# Patient Record
Sex: MALE | Race: WHITE | Employment: FULL TIME | URBAN - METROPOLITAN AREA
[De-identification: names, ages, dates, MRNs, and addresses within clinical notes are randomized per-mention and may not be internally consistent; named-entity substitution may affect disease eponyms.]

---

## 2019-03-15 ENCOUNTER — TELEPHONE (OUTPATIENT)
Dept: UROLOGY | Facility: CLINIC | Age: 59
End: 2019-03-15

## 2019-03-15 NOTE — TELEPHONE ENCOUNTER
PREVISIT INFORMATION                                                    Dima Albright scheduled for future visit at [facility] specialty clinics.    Patient is scheduled to see Dr. Mark on 3/18/19  Reason for visit: Hydrocele  Referring provider:   Has patient seen previous specialist? Yes.  Clinic/Facility [facility].   Medical Records:  CareEverywhere, will unlock at patients appointment    REVIEW                                                      New patient packet mailed to patient: No  Medication reconciliation complete: No      PLAN/FOLLOW-UP NEEDED                                                      Patient Reminders Given:    Informed patient to bring an updated list of allergies, medications, pharmacy details and insurance information. Directed patient to come to the 2nd floor, check-in D for their appointment. Informed patient to call back if appointment needs to be cancelled or rescheduled at [phone].    Reminded patient to bring any outside records regarding this appointment or have them faxed to clinic at [phone].    Reminded patient to complete and bring in urology questionnaire. Also for patient to please come with a full bladder and to ask , if early to get staff member for sample.

## 2019-03-18 ENCOUNTER — TELEPHONE (OUTPATIENT)
Dept: UROLOGY | Facility: CLINIC | Age: 59
End: 2019-03-18

## 2019-03-18 ENCOUNTER — OFFICE VISIT (OUTPATIENT)
Dept: UROLOGY | Facility: CLINIC | Age: 59
End: 2019-03-18
Payer: COMMERCIAL

## 2019-03-18 VITALS
OXYGEN SATURATION: 95 % | HEART RATE: 76 BPM | BODY MASS INDEX: 42.66 KG/M2 | HEIGHT: 72 IN | DIASTOLIC BLOOD PRESSURE: 82 MMHG | WEIGHT: 315 LBS | SYSTOLIC BLOOD PRESSURE: 142 MMHG

## 2019-03-18 DIAGNOSIS — N43.2 OTHER HYDROCELE: Primary | ICD-10-CM

## 2019-03-18 DIAGNOSIS — N43.3 LEFT HYDROCELE: ICD-10-CM

## 2019-03-18 DIAGNOSIS — E66.01 MORBID OBESITY (H): ICD-10-CM

## 2019-03-18 PROCEDURE — 99203 OFFICE O/P NEW LOW 30 MIN: CPT | Performed by: UROLOGY

## 2019-03-18 RX ORDER — CEFAZOLIN SODIUM 1 G
1 VIAL (EA) INJECTION SEE ADMIN INSTRUCTIONS
Status: CANCELLED | OUTPATIENT
Start: 2019-03-18

## 2019-03-18 ASSESSMENT — MIFFLIN-ST. JEOR: SCORE: 2355.8

## 2019-03-18 ASSESSMENT — PAIN SCALES - GENERAL: PAINLEVEL: NO PAIN (0)

## 2019-03-18 NOTE — NURSING NOTE
Dima Albright's goals for this visit include:   Chief Complaint   Patient presents with     Consult     Hematuria       He requests these members of his care team be copied on today's visit information: yes    PCP: No primary care provider on file.    Referring Provider:  No referring provider defined for this encounter.    /82   Pulse 76   Ht 1.829 m (6')   Wt 148.8 kg (328 lb)   SpO2 95%   BMI 44.48 kg/m      Do you need any medication refills at today's visit? No    Bailey Nieto CMA

## 2019-03-18 NOTE — TELEPHONE ENCOUNTER
Patient is scheduled for surgery with Dr. Mark      Spoke or left message with: Dima    Date of Surgery: 6/4/19    Location: [place] OR    Informed patient they will need an adult  yes    Pre-op with surgeon (if applicable): n/a    H&P: Scheduled with pcp    Additional imaging/appointments: n/a    Surgery packet: mailed 3/18/19     Additional comments: n/a

## 2019-03-18 NOTE — PROGRESS NOTES
I am seeing Dima Albright in consultation for evaluation of left hydrocele.    HPI:  Dima Albright is a 58 year old male has had a left hydrocele for about 10 years.  He has to sit with his leg to the side and now is getting hip pain.  He works as a .  No pain, just heavy and uncomfortable.  This has enlarged slowly with time.  He had scrotal ultrasound years ago.    PSA 1.09 2/21/19      REVIEW OF SYSTEMS:  General: negative  Skin: negative  Eyes: negative  Ears/Nose/Throat: negative  Respiratory: negative  Cardiovascular: negative  Gastrointestinal: negative  Genitourinary: see HPI  Musculoskeletal: negative  Neurologic: negative  Psychiatric: negative  Hematologic/Lymphatic/Immunologic: negative  Endocrine: negative    PAST MEDICAL HX:  No chronic medical problems     PAST SURG HX:  No history of surgery.     FAMILY HX:  No family history on file.    SOCIAL HX:  Social History     Tobacco Use     Smoking status: Current Every Day Smoker     Smokeless tobacco: Former User   Substance Use Topics     Alcohol use: Not on file     Drug use: Not on file       MEDICATIONS:  No prescription medications     ALLERGIES:  Patient has no allergy information on record.      GENERAL PHYSICAL EXAM:     /82   Pulse 76   Ht 1.829 m (6')   Wt 148.8 kg (328 lb)   SpO2 95%   BMI 44.48 kg/m     Constitutional: No acute distress. Well nourished.   PSYCH: normal mood and affect.  NEURO: normal gait, no focal deficits.   EYES: anicteric, EOMI, PERR.  CARDIOPULMONARY: breathing non-labored, pulse regular rate/rhythm, no peripheral edema.  GI: Abdomen soft, non-tender, no surgical scars, no organomegaly.  MUSCULOSKELETAL: normal limb proportions, no muscle wasting, no contractures.  SKIN: Normal virilized hair distribution, no lesions, warts or rashes over genitalia, abdomen extremities or face.  HEME/LYMPH: no ecchymosis, no lymphadenopathy in groin, no lymphedema.     EXAM:  Phallus circumcised, meatus adequate,  no plaques palpated.   Left scrotum-  This has a very large ~1L hydrocele.  + transillumination.  Right testis normal     Prostate exam: deferred     Imaging/labs:  none    ASSESSMENT:     Large left hydrocele.    PLAN:    Scrotal ultrasound     Schedule left hydrocele repair.    Medicine pre-op.  Morbid obesity and smoking history.    Jovanni Mark MD   Urological Surgeon

## 2019-03-18 NOTE — PATIENT INSTRUCTIONS
Surgery Instructions    Always follow your surgeon s instructions. If you don t, your surgery could be cancelled. Please use the following checklist.  Your surgery is on: The surgery scheduler will contact you within 1 week of your consult with the surgeon. If you do not hear from them, please call the clinic or RN Care Coordinator for your provider.    Time: Prearrival times can vary depending on location/type of surgery.  [place] - 2 hour pre-arrival  [facility] - 2 hour pre-arrival  [place] - 1 hour pre-arrival    Note:  These times may change. A nurse will call you before surgery to confirm. If you have not received a call or if you have more questions, please call us on the working day before your surgery:  ? [place]: [phone] or [phone] (9am to 5:30pm)  ? [facility]: [phone] (8am to 6pm)  ? [place]: [phone] (9am to 5pm)  ? [facility] [phone] (7am to 4pm)  Prior to surgery  ? Have a pre-op physical exam with your Primary Doctor within 30 days of surgery  - Ask your doctor to send all of your results to the surgery center/hospital before surgery. Your doctor also may ask you to bring the results with you on the day of surgery.  - Tell your doctor if:  - You are allergic to latex or rubber (latex and rubber gloves are often used in medical care).  - You are taking any medicines (including aspirin), vitamins, or herbal products. You may need to stop taking some medicines before surgery.  - You have any medical problems (allergies, diabetes, or heart disease, for example).  - You have a pacemaker or an AICD (automatic implanted cardiac defibrillator). If you do, please bring the ID card with you on the day of surgery.  - People who smoke have a higher risk of infection after surgery. Ask your doctor how you can quit smoking.  - If you Primary Doctor is not within the [employer] system, you will need to have your pre-op physical faxed to us to be scanned into your  chart.  - [place]: [phone] or [phone]  - [facility]): [phone]  - [facility] ([facility]): [phone]  ? Call your insurance company. Ask if you need pre-approval for your surgery. If you do not have insurance, please let us know. If you wish to speak to the , please alert the clinic staff so this can be arranged.  ? Arrange for someone to drive you home after surgery.  will need to be a responsible adult (18 years or older) that will provide transportation to and from surgery and stay in the waiting room during your surgery. You may not drive yourself or take public transportation to and from surgery.  ? Arrange for someone to stay with you for 24 hours after you go home. This person must be a responsible adult (18 years or older).  ? Call your surgeon or their nurse if there is any change in your health (cold, flu, infections, hospitalizations).  ? Do not smoke, drink alcohol, or take over-the-counter medicine for 24 hours before and after surgery.  ? If you take prescribed drugs, you may need to stop them until after the surgery.  Discuss what medications to take or not take prior to surgery with your Primary Doctor at your pre-op physical. Avoid over-the-counter blood-thinning medications such as Aspirin, Ibuprofen, vitamin E, or fish oil 7 days prior to surgery (unless otherwise directed by your Primary Doctor). Tylenol is a good alternative for mild pain relief prior to surgery.  ? Eating and drinking guidelines prior to surgery:  - Stop all solid food consumption 8 hours prior to surgery  - You may drink clear liquids up to 2 hours prior to surgery (water, fruit juices without pulp, jello, tea/coffee without creamer, sports drinks, clear-fat free broth (bouillon or consomme), popsicles (without milk, bits of fruit, or seeds/nuts)  ? Follow instructions given for showering or bathing before surgery.    - Use 8 ounces of antiseptic surgical soap,  like:  - Hibiclens, Scrub Care, or Exidine  - You can find it at your local pharmacy, clinic, or retail store. If you have trouble, ask your pharmacist to help you find the right substitute.  - Please wash with one of the above soaps twice before coming to the hospital for your surgery. This will decrease bacteria (germs) on your skin. It will also help reduce your chance of infection after surgery.  - Items you will need for showering:  - 4 newly washed washcloths  - 2 newly washed towels  - 8 ounces of one of the above soaps  - Following these instructions:  - The evening before surgery: Shower or bathe as you normally would, using your regular soap and a clean washcloth. Give special attention to places where your incision (surgical cut) or catheters will be. This includes your groin area. Rinse well. You may wash your hair with your regular shampoo. Next, wash your body with 4 ounces of the antiseptic soap. Use a clean, damp washcloth and gently clean your body (from the chin down). If your surgery involves your head, use the special soap on your head and scalp. Rinse well and dry off using a newly washed towel.  - The morning of surgery: Repeat the same process as the evening shower.  - Other suggestions:    Do not shave within 12 inches of your incision (surgical cut) area for at least 3 days before surgery. Shaving can make small cuts in the skin. This puts you at higher risk of infection.    Wear freshly washed pajamas or clothing after your evening shower.    Wear freshly washed clothes the day of surgery.    Wash and change your bed sheets the day before surgery to have clean bed sheets after your shower and when you get home from surgery.    If you have trouble washing all areas, make sure someone helps you.    Don't use any deodorant, lotion or powder after your shower.    Women who are menstruating should wear a fresh sanitary pad to the hospital.  ? Do not wear or add deodorant, cologne, lotion,  makeup, nail polish or jewelry to surgery. If you wear fake nails, please remove at least one nail before coming to surgery (an oxygen monitor needs to be placed on your finger during surgery).  ? Bring these items to the surgery center/hospital:  - Insurance card  - Money for parking and co-pays, if needed  - A list of all the medicines you take. Include vitamins, minerals, herbs, and over-the-counter drugs.  Note any drug allergies.  - A copy of your advance health care directive, if you have one. This tells us what treatment you would want--and who would make health care decisions--if you could no longer speak for yourself. You may request this form in advance or download it from [email or web address].  - A case for glasses, contact lenses, hearing aids, or dentures.  - Your inhaler or CPAP machine, if you use these at home.  ? Leave extra cash, jewelry, and other valuables at home.  When you arrive  When you get to the surgery center/hospital, you will:  ? Check in. If you are under age 18, you must be with a parent or legal guardian.  ? Sign consent forms, if you haven t already. These forms state that you know the risks and benefits of surgery. When you sign the forms, you give us permission to do the surgery. Do not sign them unless you understand what will happen during and after your surgery. If you have any questions about your surgery, ask to speak with your doctor before you sign the forms. If you don t understand the answers, ask again.  ? Receive a copy of the Patient s Bill of Rights. If you do not receive a copy, please ask for one.  ? Change into hospital clothes. Your belongings will be placed in a bag. We will return them to you after surgery.  ? Meet with the anesthesia provider. He or she will tell you what kind of anesthesia (medicine) will be used to keep you comfortable during surgery.  Remember: it s okay to remind doctors and nurses to wash their hands before touching you.  In most  cases, your surgeon will use a marker to write his or her initials on the surgery site. This ensures that the exact site is operated on.  For safety reasons, we will ask you the same questions many times. For example, we may ask your name and birth date over and over again.  Friends and family can stay with you until it s time for surgery. While you re in surgery, they will be in the waiting area. Please note that cell phones are not allowed in some patient care areas.  If you have questions about what will happen in the operating room, talk to your care team.  After surgery  We will move you to a recovery room, where we will watch you closely. If you have any pain or discomfort, tell your nurse. He or she will try to make you comfortable.  If you are staying overnight, we will move you to your hospital room after you are awake.  If you are going home, we will move you to another room. Friends and family may be able to join you. The length of time you spend in recovery depend on the type of medicine you received, your medical condition, the type of surgery you had, or your response to the anesthesia given during your procedure.  When you are discharged from the recovery room, the nurses will review instructions with you and your caregiver.  ? Please wash your hands every time you touch the wound or change bandages or dressings.  ? Do not submerge the wound in water.  You may not use a bathtub or hot tub until the wound is closed. The wait time frame is generally 2-3 weeks, but any open area can be a source of incoming bacteria, so it is better to be on the safe side and avoid water submersion until your wound is fully healed.  ? You may take a shower 24 hours after surgery. Double check with your surgeon if it is OK for water to run over the wound, whether it has been sutured, stapled, glued, or is open. You may gently wash the wound using the antiseptic soap provided for your pre-surgery showering (do not use a  washcloth). Any mild soap will work as well.  ? Many surgical wounds will have small white strips of tape on them called steri-strips.  Do not remove these. The edges will curl and fall off within 7-10 days with normal showering.  ? If you are going home with sutures (stitches) or staples, you must return to the clinic to have them taken out, usually within 1-2 weeks. Some stitches are dissolvable and do not require removal. Make sure to clarify with your surgeon or surgery nurse reviewing discharge paperwork what kind of sutures you have.  ? Signs and symptoms of infection include:  - Fever, temperature over 101.5   F  - Redness  - Swelling  - Increased pain  - Green or yellow drainage which may or may not have a foul odor  Dealing with pain  A nurse will check your comfort level often during your stay. He or she will work with you to manage your pain.  Remember:  ? All pain is real. There are many ways to control pain. We can help you decide what works best for you.  ? Ask for pain medicine when you need it. Don t try to  tough it out --this can make you feel worse. Always take your medicine as ordered.  ? Medicine doesn t work the same for everyone. If your medicine isn t working, tell your nurse. There may be other medicines or treatments we can try.  Going home  We will let you know when you re ready to leave the surgery center or hospital. Before you leave, we will tell you how to care for yourself at home and prevent infections. If you do not understand something, please say so. We will answer any questions you have. We will then help you get ready to leave.  Remember, you must have a responsible adult (18 years or older) to stay with you 24 hours after you leave the hospital.  Take it easy when you get home. You will need some time to recover--you may be more tired than you realize at first. Rest and relax for at least the first 24 hours at home. You ll feel better and heal faster if you take good care of  yourself.  Follow the discharge instructions that are given to you when you leave the surgery center or hospital  Please call the clinic if you experience any problems during regular clinic hours (Monday-Friday 8:00am-5:00pm).  If you experience problems during non-clinic hours, please call the [facility] on-call line at [phone] and ask the  to page the on-call Provider for your specialty. The on-call Provider will call you back and can triage your symptoms and further advise. If you are having an emergency, always call 911 or seek immediate evaluation at the Emergency Room.  Locations  [facility] ([facility])  [address] [place] [id number]  [phone]   [email or web address]

## 2019-03-19 ENCOUNTER — ANCILLARY PROCEDURE (OUTPATIENT)
Dept: ULTRASOUND IMAGING | Facility: CLINIC | Age: 59
End: 2019-03-19
Attending: UROLOGY
Payer: COMMERCIAL

## 2019-03-19 DIAGNOSIS — N43.3 LEFT HYDROCELE: ICD-10-CM

## 2019-03-19 PROCEDURE — 93976 VASCULAR STUDY: CPT | Performed by: STUDENT IN AN ORGANIZED HEALTH CARE EDUCATION/TRAINING PROGRAM

## 2019-03-19 PROCEDURE — 76870 US EXAM SCROTUM: CPT | Mod: 51 | Performed by: STUDENT IN AN ORGANIZED HEALTH CARE EDUCATION/TRAINING PROGRAM

## 2019-05-20 ENCOUNTER — OFFICE VISIT (OUTPATIENT)
Dept: PEDIATRICS | Facility: CLINIC | Age: 59
End: 2019-05-20
Payer: COMMERCIAL

## 2019-05-20 VITALS
OXYGEN SATURATION: 95 % | TEMPERATURE: 99.2 F | WEIGHT: 315 LBS | HEIGHT: 72 IN | HEART RATE: 72 BPM | DIASTOLIC BLOOD PRESSURE: 92 MMHG | BODY MASS INDEX: 42.66 KG/M2 | SYSTOLIC BLOOD PRESSURE: 154 MMHG

## 2019-05-20 DIAGNOSIS — G47.33 OSA ON CPAP: ICD-10-CM

## 2019-05-20 DIAGNOSIS — E66.01 MORBID OBESITY (H): ICD-10-CM

## 2019-05-20 DIAGNOSIS — Z01.818 PREOP GENERAL PHYSICAL EXAM: Primary | ICD-10-CM

## 2019-05-20 DIAGNOSIS — I10 MILD HYPERTENSION: ICD-10-CM

## 2019-05-20 DIAGNOSIS — R07.89 CHEST WALL TENDERNESS: ICD-10-CM

## 2019-05-20 DIAGNOSIS — N43.3 LEFT HYDROCELE: ICD-10-CM

## 2019-05-20 PROCEDURE — 93000 ELECTROCARDIOGRAM COMPLETE: CPT | Performed by: INTERNAL MEDICINE

## 2019-05-20 PROCEDURE — 99204 OFFICE O/P NEW MOD 45 MIN: CPT | Performed by: INTERNAL MEDICINE

## 2019-05-20 RX ORDER — CYCLOBENZAPRINE HCL 10 MG
10 TABLET ORAL AT BEDTIME
Qty: 5 TABLET | Refills: 0 | Status: SHIPPED | OUTPATIENT
Start: 2019-05-20 | End: 2019-07-10

## 2019-05-20 RX ORDER — LISINOPRIL 10 MG/1
10 TABLET ORAL DAILY
Qty: 90 TABLET | Refills: 3 | Status: SHIPPED | OUTPATIENT
Start: 2019-05-20 | End: 2019-07-15

## 2019-05-20 ASSESSMENT — MIFFLIN-ST. JEOR: SCORE: 2368.5

## 2019-05-20 NOTE — PROGRESS NOTES
[address] [address]  [phone]  Dept: [phone]    PRE-OP EVALUATION:  Today's date: 2019    Dima Albright (: 1960) presents for pre-operative evaluation assessment as requested by Dr. Jovanni Mark.  He requires evaluation and anesthesia risk assessment prior to undergoing surgery/procedure for treatment of LEFT SCROTAL HYDROCELECTOMY REPARIR, POSSIBLE SCROTAL DRAIN PLACEMENT .    Proposed Surgery/ Procedure: LEFT SCROTAL HYDROCELECTOMY REPARIR, POSSIBLE SCROTAL DRAIN PLACEMENT  Date of Surgery/ Procedure: 19  Time of Surgery/ Procedure: 11:30 am  Hospital/Surgical Facility[place] [facility] [place]  Available electronically  Primary Physician: Physician Cynthia  Type of Anesthesia Anticipated: General    Patient has a Health Care Directive or Living Will:  NO    1. NO - Do you have a history of heart attack, stroke, stent, bypass or surgery on an artery in the head, neck, heart or legs?  2. NO - Do you ever have any pain or discomfort in your chest?  3. NO - Do you have a history of  Heart Failure?  4. YES - Are you troubled by shortness of breath when: walking on the level, up a slight hill or at night?  5. NO - Do you currently have a cold, bronchitis or other respiratory infection?  6. YES - Do you have a cough, shortness of breath or wheezing?  7. NO - Do you sometimes get pains in the calves of your legs when you walk?  8. NO - Do you or anyone in your family have previous history of blood clots?  9. NO - Do you or does anyone in your family have a serious bleeding problem such as prolonged bleeding following surgeries or cuts?  10. NO - Have you ever had problems with anemia or been told to take iron pills?  11. NO - Have you had any abnormal blood loss such as black, tarry or bloody stools, or abnormal vaginal bleeding?  12. NO - Have you ever had a blood transfusion?  13. NO - Have you or any of your  relatives ever had problems with anesthesia?  14. YES - Do you have sleep apnea, excessive snoring or daytime drowsiness? Sleep apnea/uses CPAP machine  15. NO - Do you have any prosthetic heart valves?  16. NO - Do you have prosthetic joints?  17. NO - Is there any chance that you may be pregnant?      HPI:     HPI related to upcoming procedure:     58-year-old gentleman came in for preop medical clearance and consultation for planned left hydrocelectomy.  He has had a left hydrocele for 10 years and it has gradually gotten bigger.  It does bother him particularly with ambulation.  He has morbid obesity and obstructive sleep apnea.  He uses his CPAP regularly.  Indicates that in the past his blood pressure has been borderline high in the 140s systolic range.  Few years ago he was prescribed a diuretic for mildly elevated blood pressure.  He like the side effect of increased frequency of urination.  He stopped taking it.  He does not follow a good low-salt diet.  He is a  by trade and spends a lot of time sitting.  Does not do any regular exercise.  Denies chest pain, shortness of breath, dizziness or lightheadedness.  He does notice some leg edema after he has been didi for a few days.  Denies gastrointestinal symptoms.  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  He felt a pop.  He was seen in the emergency room and was told to take anti-inflammatory medication.  He has been taking Advil and the symptoms have improved though still worst at night and he feels there is spasm of the muscles.      MEDICAL HISTORY:     Patient Active Problem List    Diagnosis Date Noted     Left hydrocele 05/20/2019     Priority: Medium     MICHA on CPAP 05/20/2019     Priority: Medium     Mild hypertension      Priority: Medium     Morbid obesity (H) 03/18/2019     Priority: Medium      Past Medical History:   Diagnosis  Date     Left hydrocele 5/20/2019     Mild hypertension      Morbid obesity (H) 3/18/2019     MICHA on CPAP 5/20/2019     History reviewed. No pertinent surgical history.  Current Outpatient Medications   Medication Sig Dispense Refill     cyclobenzaprine (FLEXERIL) 10 MG tablet Take 1 tablet (10 mg) by mouth At Bedtime for 5 days 5 tablet 0     lisinopril (PRINIVIL/ZESTRIL) 10 MG tablet Take 1 tablet (10 mg) by mouth daily 90 tablet 3     OTC products: None, except as noted above    No Known Allergies   Latex Allergy: NO    Social History     Tobacco Use     Smoking status: Current Every Day Smoker     Smokeless tobacco: Former User   Substance Use Topics     Alcohol use: Not on file     History   Drug Use Not on file       REVIEW OF SYSTEMS:   Constitutional, neuro, ENT, endocrine, pulmonary, cardiac, gastrointestinal, genitourinary, musculoskeletal, integument and psychiatric systems are negative, except as otherwise noted.    EXAM:   BP (!) 154/92   Pulse 72   Temp 99.2  F (37.3  C) (Temporal)   Ht 1.829 m (6')   Wt (!) 150 kg (330 lb 12.8 oz)   SpO2 95%   BMI 44.86 kg/m      GENERAL APPEARANCE: healthy, alert and no distress     EYES: EOMI,  PERRL     HENT: ear canals and TM's normal and nose and mouth without ulcers or lesions     NECK: no adenopathy, no asymmetry, masses, or scars and thyroid normal to palpation     RESP: lungs clear to auscultation - no rales, rhonchi or wheezes. Left lateral chest wall tenderness     CV: regular rates and rhythm, normal S1 S2, no S3 or S4 and no murmur, click or rub     ABDOMEN:  soft, nontender, no HSM or masses and bowel sounds normal     GU_male: hydrocele present left     MS: extremities normal- no gross deformities noted, no evidence of inflammation in joints, FROM in all extremities.     SKIN: no suspicious lesions or rashes     NEURO: Normal strength and tone, sensory exam grossly normal, mentation intact and speech normal     PSYCH: mentation appears normal. and  affect normal/bright     LYMPHATICS: No cervical adenopathy    DIAGNOSTICS:   EKG: appears normal, NSR, normal axis, normal intervals, no acute ST/T changes c/w ischemia, no LVH by voltage criteria,     No results for input(s): HGB, PLT, INR, NA, POTASSIUM, CR, A1C in the last 44762 hours.     IMPRESSION:   Diagnosis/reason for consult:     1.  Large left-sided hydrocele.  Patient to undergo surgery.  2.  Morbid obesity.  3.  Obstructive sleep apnea on CPAP.  4.  Mild essential hypertension.  Patient was agreeable to treatment and started him on lisinopril 10 mg a day.  Side effects discussed.  He will follow-up in 4 weeks.  5.  Left lateral chest wall pain is area of tenderness.  Rib fracture cannot be ruled out.  Treatment is symptomatic.  I did give him a prescription of Flexeril 10 mg to be taken at bedtime for the next 4 to 5 days.  He is now going to be didi next for 5 days.  Side effects discussed.    Fasting CMP CBC and lipids will be checked.  I will get back to the results.        The proposed surgical procedure is considered LOW risk.    REVISED CARDIAC RISK INDEX  The patient has the following serious cardiovascular risks for perioperative complications such as (MI, PE, VFib and 3  AV Block):  No serious cardiac risks  INTERPRETATION: 0 risks: Class I (very low risk - 0.4% complication rate)    The patient has the following additional risks for perioperative complications:  No identified additional risks      ICD-10-CM    1. Preop general physical exam Z01.818 EKG 12-lead complete w/read - Clinics   2. Left hydrocele N43.3 Comprehensive metabolic panel     CBC with platelets   3. Morbid obesity (H) E66.01    4. MICHA on CPAP G47.33     Z99.89    5. Mild hypertension I10 Comprehensive metabolic panel     CBC with platelets     Lipid Profile     lisinopril (PRINIVIL/ZESTRIL) 10 MG tablet   6. Chest wall tenderness R07.89 cyclobenzaprine (FLEXERIL) 10 MG tablet       RECOMMENDATIONS:     Patient is  medically optimized to undergo the planned surgical procedure.b    --Patient is to take all scheduled medications on the day of surgery EXCEPT for modifications listed below.    APPROVAL GIVEN to proceed with proposed procedure, without further diagnostic evaluation       Signed Electronically by: Jake Walker MD    Copy of this evaluation report is provided to requesting physician.    [facility] Preop Guidelines    Revised Cardiac Risk Index

## 2019-05-21 DIAGNOSIS — N43.3 LEFT HYDROCELE: ICD-10-CM

## 2019-05-21 DIAGNOSIS — I10 MILD HYPERTENSION: ICD-10-CM

## 2019-05-21 LAB
ALBUMIN SERPL-MCNC: 3.6 G/DL (ref 3.4–5)
ALP SERPL-CCNC: 111 U/L (ref 40–150)
ALT SERPL W P-5'-P-CCNC: 29 U/L (ref 0–70)
ANION GAP SERPL CALCULATED.3IONS-SCNC: 5 MMOL/L (ref 3–14)
AST SERPL W P-5'-P-CCNC: 20 U/L (ref 0–45)
BILIRUB SERPL-MCNC: 0.3 MG/DL (ref 0.2–1.3)
BUN SERPL-MCNC: 16 MG/DL (ref 7–30)
CALCIUM SERPL-MCNC: 9 MG/DL (ref 8.5–10.1)
CHLORIDE SERPL-SCNC: 107 MMOL/L (ref 94–109)
CHOLEST SERPL-MCNC: 212 MG/DL
CO2 SERPL-SCNC: 26 MMOL/L (ref 20–32)
CREAT SERPL-MCNC: 0.91 MG/DL (ref 0.66–1.25)
ERYTHROCYTE [DISTWIDTH] IN BLOOD BY AUTOMATED COUNT: 13.5 % (ref 10–15)
GFR SERPL CREATININE-BSD FRML MDRD: >90 ML/MIN/{1.73_M2}
GLUCOSE SERPL-MCNC: 94 MG/DL (ref 70–99)
HCT VFR BLD AUTO: 49.6 % (ref 40–53)
HDLC SERPL-MCNC: 37 MG/DL
HGB BLD-MCNC: 16.3 G/DL (ref 13.3–17.7)
LDLC SERPL CALC-MCNC: 116 MG/DL
MCH RBC QN AUTO: 30.6 PG (ref 26.5–33)
MCHC RBC AUTO-ENTMCNC: 32.9 G/DL (ref 31.5–36.5)
MCV RBC AUTO: 93 FL (ref 78–100)
NONHDLC SERPL-MCNC: 175 MG/DL
PLATELET # BLD AUTO: 321 10E9/L (ref 150–450)
POTASSIUM SERPL-SCNC: 4.3 MMOL/L (ref 3.4–5.3)
PROT SERPL-MCNC: 7.7 G/DL (ref 6.8–8.8)
RBC # BLD AUTO: 5.33 10E12/L (ref 4.4–5.9)
SODIUM SERPL-SCNC: 138 MMOL/L (ref 133–144)
TRIGL SERPL-MCNC: 295 MG/DL
WBC # BLD AUTO: 13 10E9/L (ref 4–11)

## 2019-05-21 PROCEDURE — 80061 LIPID PANEL: CPT | Performed by: INTERNAL MEDICINE

## 2019-05-21 PROCEDURE — 85027 COMPLETE CBC AUTOMATED: CPT | Performed by: INTERNAL MEDICINE

## 2019-05-21 PROCEDURE — 36415 COLL VENOUS BLD VENIPUNCTURE: CPT | Performed by: INTERNAL MEDICINE

## 2019-05-21 PROCEDURE — 80053 COMPREHEN METABOLIC PANEL: CPT | Performed by: INTERNAL MEDICINE

## 2019-06-04 ENCOUNTER — ANESTHESIA EVENT (OUTPATIENT)
Dept: SURGERY | Facility: CLINIC | Age: 59
End: 2019-06-04
Payer: COMMERCIAL

## 2019-06-04 ENCOUNTER — HOSPITAL ENCOUNTER (OUTPATIENT)
Facility: CLINIC | Age: 59
Discharge: HOME | End: 2019-06-04
Attending: UROLOGY | Admitting: UROLOGY
Payer: COMMERCIAL

## 2019-06-04 ENCOUNTER — ANESTHESIA (OUTPATIENT)
Dept: SURGERY | Facility: CLINIC | Age: 59
End: 2019-06-04
Payer: COMMERCIAL

## 2019-06-04 VITALS
DIASTOLIC BLOOD PRESSURE: 68 MMHG | TEMPERATURE: 98 F | HEIGHT: 72 IN | WEIGHT: 315 LBS | BODY MASS INDEX: 42.66 KG/M2 | SYSTOLIC BLOOD PRESSURE: 129 MMHG | HEART RATE: 86 BPM | OXYGEN SATURATION: 92 % | RESPIRATION RATE: 20 BRPM

## 2019-06-04 DIAGNOSIS — N43.3 LEFT HYDROCELE: Primary | ICD-10-CM

## 2019-06-04 LAB — GLUCOSE BLDC GLUCOMTR-MCNC: 124 MG/DL (ref 70–99)

## 2019-06-04 PROCEDURE — 36000053 ZZH SURGERY LEVEL 2 EA 15 ADDTL MIN - UMMC: Performed by: UROLOGY

## 2019-06-04 PROCEDURE — 25000125 ZZHC RX 250: Performed by: NURSE ANESTHETIST, CERTIFIED REGISTERED

## 2019-06-04 PROCEDURE — 36000051 ZZH SURGERY LEVEL 2 1ST 30 MIN - UMMC: Performed by: UROLOGY

## 2019-06-04 PROCEDURE — 25000564 ZZH DESFLURANE, EA 15 MIN: Performed by: UROLOGY

## 2019-06-04 PROCEDURE — 37000008 ZZH ANESTHESIA TECHNICAL FEE, 1ST 30 MIN: Performed by: UROLOGY

## 2019-06-04 PROCEDURE — 25000132 ZZH RX MED GY IP 250 OP 250 PS 637: Performed by: NURSE ANESTHETIST, CERTIFIED REGISTERED

## 2019-06-04 PROCEDURE — 25000128 H RX IP 250 OP 636: Performed by: NURSE ANESTHETIST, CERTIFIED REGISTERED

## 2019-06-04 PROCEDURE — 40000170 ZZH STATISTIC PRE-PROCEDURE ASSESSMENT II: Performed by: UROLOGY

## 2019-06-04 PROCEDURE — 25000125 ZZHC RX 250: Performed by: UROLOGY

## 2019-06-04 PROCEDURE — 25000566 ZZH SEVOFLURANE, EA 15 MIN: Performed by: UROLOGY

## 2019-06-04 PROCEDURE — 88302 TISSUE EXAM BY PATHOLOGIST: CPT | Performed by: UROLOGY

## 2019-06-04 PROCEDURE — 88302 TISSUE EXAM BY PATHOLOGIST: CPT | Mod: 26 | Performed by: UROLOGY

## 2019-06-04 PROCEDURE — 27210794 ZZH OR GENERAL SUPPLY STERILE: Performed by: UROLOGY

## 2019-06-04 PROCEDURE — 25800030 ZZH RX IP 258 OP 636: Performed by: NURSE ANESTHETIST, CERTIFIED REGISTERED

## 2019-06-04 PROCEDURE — 25000128 H RX IP 250 OP 636: Performed by: ANESTHESIOLOGY

## 2019-06-04 PROCEDURE — 71000014 ZZH RECOVERY PHASE 1 LEVEL 2 FIRST HR: Performed by: UROLOGY

## 2019-06-04 PROCEDURE — 37000009 ZZH ANESTHESIA TECHNICAL FEE, EACH ADDTL 15 MIN: Performed by: UROLOGY

## 2019-06-04 PROCEDURE — 25000128 H RX IP 250 OP 636: Performed by: UROLOGY

## 2019-06-04 PROCEDURE — 71000015 ZZH RECOVERY PHASE 1 LEVEL 2 EA ADDTL HR: Performed by: UROLOGY

## 2019-06-04 PROCEDURE — 82962 GLUCOSE BLOOD TEST: CPT

## 2019-06-04 PROCEDURE — 71000027 ZZH RECOVERY PHASE 2 EACH 15 MINS: Performed by: UROLOGY

## 2019-06-04 RX ORDER — PROPOFOL 10 MG/ML
INJECTION, EMULSION INTRAVENOUS PRN
Status: DISCONTINUED | OUTPATIENT
Start: 2019-06-04 | End: 2019-06-04

## 2019-06-04 RX ORDER — CEFAZOLIN SODIUM 1 G/3ML
1 INJECTION, POWDER, FOR SOLUTION INTRAMUSCULAR; INTRAVENOUS SEE ADMIN INSTRUCTIONS
Status: DISCONTINUED | OUTPATIENT
Start: 2019-06-04 | End: 2019-06-04 | Stop reason: HOSPADM

## 2019-06-04 RX ORDER — FENTANYL CITRATE 50 UG/ML
INJECTION, SOLUTION INTRAMUSCULAR; INTRAVENOUS PRN
Status: DISCONTINUED | OUTPATIENT
Start: 2019-06-04 | End: 2019-06-04

## 2019-06-04 RX ORDER — NALOXONE HYDROCHLORIDE 0.4 MG/ML
.1-.4 INJECTION, SOLUTION INTRAMUSCULAR; INTRAVENOUS; SUBCUTANEOUS
Status: DISCONTINUED | OUTPATIENT
Start: 2019-06-04 | End: 2019-06-04 | Stop reason: HOSPADM

## 2019-06-04 RX ORDER — FENTANYL CITRATE 50 UG/ML
25-50 INJECTION, SOLUTION INTRAMUSCULAR; INTRAVENOUS
Status: DISCONTINUED | OUTPATIENT
Start: 2019-06-04 | End: 2019-06-04 | Stop reason: HOSPADM

## 2019-06-04 RX ORDER — ONDANSETRON 2 MG/ML
4 INJECTION INTRAMUSCULAR; INTRAVENOUS EVERY 30 MIN PRN
Status: DISCONTINUED | OUTPATIENT
Start: 2019-06-04 | End: 2019-06-04 | Stop reason: HOSPADM

## 2019-06-04 RX ORDER — SODIUM CHLORIDE, SODIUM LACTATE, POTASSIUM CHLORIDE, CALCIUM CHLORIDE 600; 310; 30; 20 MG/100ML; MG/100ML; MG/100ML; MG/100ML
INJECTION, SOLUTION INTRAVENOUS CONTINUOUS
Status: DISCONTINUED | OUTPATIENT
Start: 2019-06-04 | End: 2019-06-04 | Stop reason: HOSPADM

## 2019-06-04 RX ORDER — ALBUTEROL SULFATE 90 UG/1
AEROSOL, METERED RESPIRATORY (INHALATION) PRN
Status: DISCONTINUED | OUTPATIENT
Start: 2019-06-04 | End: 2019-06-04

## 2019-06-04 RX ORDER — MEPERIDINE HYDROCHLORIDE 25 MG/ML
12.5 INJECTION INTRAMUSCULAR; INTRAVENOUS; SUBCUTANEOUS
Status: DISCONTINUED | OUTPATIENT
Start: 2019-06-04 | End: 2019-06-04 | Stop reason: HOSPADM

## 2019-06-04 RX ORDER — CITRIC ACID/SODIUM CITRATE 334-500MG
SOLUTION, ORAL ORAL PRN
Status: DISCONTINUED | OUTPATIENT
Start: 2019-06-04 | End: 2019-06-04

## 2019-06-04 RX ORDER — HYDROMORPHONE HYDROCHLORIDE 1 MG/ML
.3-.5 INJECTION, SOLUTION INTRAMUSCULAR; INTRAVENOUS; SUBCUTANEOUS EVERY 10 MIN PRN
Status: DISCONTINUED | OUTPATIENT
Start: 2019-06-04 | End: 2019-06-04 | Stop reason: HOSPADM

## 2019-06-04 RX ORDER — BACITRACIN ZINC 500 [USP'U]/G
OINTMENT TOPICAL PRN
Status: DISCONTINUED | OUTPATIENT
Start: 2019-06-04 | End: 2019-06-04 | Stop reason: HOSPADM

## 2019-06-04 RX ORDER — MAGNESIUM HYDROXIDE 1200 MG/15ML
LIQUID ORAL PRN
Status: DISCONTINUED | OUTPATIENT
Start: 2019-06-04 | End: 2019-06-04 | Stop reason: HOSPADM

## 2019-06-04 RX ORDER — OXYCODONE HYDROCHLORIDE 5 MG/1
5-10 TABLET ORAL EVERY 4 HOURS PRN
Qty: 20 TABLET | Refills: 0 | Status: ON HOLD | OUTPATIENT
Start: 2019-06-04 | End: 2019-06-15

## 2019-06-04 RX ORDER — SODIUM CHLORIDE, SODIUM LACTATE, POTASSIUM CHLORIDE, CALCIUM CHLORIDE 600; 310; 30; 20 MG/100ML; MG/100ML; MG/100ML; MG/100ML
INJECTION, SOLUTION INTRAVENOUS CONTINUOUS PRN
Status: DISCONTINUED | OUTPATIENT
Start: 2019-06-04 | End: 2019-06-04

## 2019-06-04 RX ORDER — OXYCODONE HYDROCHLORIDE 5 MG/1
5 TABLET ORAL EVERY 4 HOURS PRN
Status: DISCONTINUED | OUTPATIENT
Start: 2019-06-04 | End: 2019-06-04 | Stop reason: HOSPADM

## 2019-06-04 RX ORDER — BUPIVACAINE HYDROCHLORIDE 5 MG/ML
INJECTION, SOLUTION PERINEURAL PRN
Status: DISCONTINUED | OUTPATIENT
Start: 2019-06-04 | End: 2019-06-04 | Stop reason: HOSPADM

## 2019-06-04 RX ORDER — ONDANSETRON 4 MG/1
4 TABLET, ORALLY DISINTEGRATING ORAL EVERY 30 MIN PRN
Status: DISCONTINUED | OUTPATIENT
Start: 2019-06-04 | End: 2019-06-04 | Stop reason: HOSPADM

## 2019-06-04 RX ORDER — ONDANSETRON 2 MG/ML
INJECTION INTRAMUSCULAR; INTRAVENOUS PRN
Status: DISCONTINUED | OUTPATIENT
Start: 2019-06-04 | End: 2019-06-04

## 2019-06-04 RX ORDER — CEFAZOLIN SODIUM IN 0.9 % NACL 3 G/100 ML
3 INTRAVENOUS SOLUTION, PIGGYBACK (ML) INTRAVENOUS
Status: COMPLETED | OUTPATIENT
Start: 2019-06-04 | End: 2019-06-04

## 2019-06-04 RX ORDER — LIDOCAINE HYDROCHLORIDE 20 MG/ML
INJECTION, SOLUTION INFILTRATION; PERINEURAL PRN
Status: DISCONTINUED | OUTPATIENT
Start: 2019-06-04 | End: 2019-06-04

## 2019-06-04 RX ADMIN — FENTANYL CITRATE 50 MCG: 50 INJECTION, SOLUTION INTRAMUSCULAR; INTRAVENOUS at 12:37

## 2019-06-04 RX ADMIN — MIDAZOLAM 2 MG: 1 INJECTION INTRAMUSCULAR; INTRAVENOUS at 11:40

## 2019-06-04 RX ADMIN — Medication 3 G: at 13:52

## 2019-06-04 RX ADMIN — HYDROMORPHONE HYDROCHLORIDE 0.5 MG: 1 INJECTION, SOLUTION INTRAMUSCULAR; INTRAVENOUS; SUBCUTANEOUS at 13:21

## 2019-06-04 RX ADMIN — FENTANYL CITRATE 50 MCG: 50 INJECTION, SOLUTION INTRAMUSCULAR; INTRAVENOUS at 12:00

## 2019-06-04 RX ADMIN — Medication 100 MG: at 12:00

## 2019-06-04 RX ADMIN — ALBUTEROL SULFATE 12 PUFF: 90 AEROSOL, METERED RESPIRATORY (INHALATION) at 12:12

## 2019-06-04 RX ADMIN — HYDROMORPHONE HYDROCHLORIDE 0.25 MG: 1 INJECTION, SOLUTION INTRAMUSCULAR; INTRAVENOUS; SUBCUTANEOUS at 14:11

## 2019-06-04 RX ADMIN — Medication 3 G: at 11:45

## 2019-06-04 RX ADMIN — PHENYLEPHRINE HYDROCHLORIDE 100 MCG: 10 INJECTION INTRAVENOUS at 12:46

## 2019-06-04 RX ADMIN — SUGAMMADEX 300 MG: 100 INJECTION, SOLUTION INTRAVENOUS at 14:16

## 2019-06-04 RX ADMIN — PROPOFOL 50 MG: 10 INJECTION, EMULSION INTRAVENOUS at 12:37

## 2019-06-04 RX ADMIN — SODIUM CITRATE AND CITRIC ACID MONOHYDRATE 30 ML: 500; 334 SOLUTION ORAL at 11:52

## 2019-06-04 RX ADMIN — SODIUM CHLORIDE, SODIUM LACTATE, POTASSIUM CHLORIDE, CALCIUM CHLORIDE: 600; 310; 30; 20 INJECTION, SOLUTION INTRAVENOUS at 11:31

## 2019-06-04 RX ADMIN — PHENYLEPHRINE HYDROCHLORIDE 100 MCG: 10 INJECTION INTRAVENOUS at 12:54

## 2019-06-04 RX ADMIN — ONDANSETRON 4 MG: 2 INJECTION INTRAMUSCULAR; INTRAVENOUS at 15:45

## 2019-06-04 RX ADMIN — HYDROMORPHONE HYDROCHLORIDE 0.5 MG: 1 INJECTION, SOLUTION INTRAMUSCULAR; INTRAVENOUS; SUBCUTANEOUS at 12:44

## 2019-06-04 RX ADMIN — LIDOCAINE HYDROCHLORIDE 40 MG: 20 INJECTION, SOLUTION INFILTRATION; PERINEURAL at 12:00

## 2019-06-04 RX ADMIN — SODIUM CHLORIDE, SODIUM LACTATE, POTASSIUM CHLORIDE, CALCIUM CHLORIDE: 600; 310; 30; 20 INJECTION, SOLUTION INTRAVENOUS at 14:17

## 2019-06-04 RX ADMIN — PROPOFOL 200 MG: 10 INJECTION, EMULSION INTRAVENOUS at 12:00

## 2019-06-04 RX ADMIN — ROCURONIUM BROMIDE 30 MG: 10 INJECTION INTRAVENOUS at 12:10

## 2019-06-04 RX ADMIN — ONDANSETRON 4 MG: 2 INJECTION INTRAMUSCULAR; INTRAVENOUS at 12:07

## 2019-06-04 ASSESSMENT — MIFFLIN-ST. JEOR: SCORE: 2363.13

## 2019-06-04 ASSESSMENT — LIFESTYLE VARIABLES: TOBACCO_USE: 1

## 2019-06-04 NOTE — ANESTHESIA POSTPROCEDURE EVALUATION
Anesthesia POST Procedure Evaluation    Patient: Dima Albright   MRN:     [id number] Gender:   male   Age:    58 year old :      1960        Preoperative Diagnosis: Left Hydrocele   Procedure(s):  LEFT SCROTAL HYDROCELECTOMY REPAIR, AND SCROTAL DRAIN PLACEMENT   Postop Comments: No value filed.       Anesthesia Type:  General  No value filed.    Reportable Event: NO     PAIN: Uncomplicated   Sign Out status: Comfortable, Well controlled pain     PONV: No PONV   Sign Out status:  No Nausea or Vomiting     Neuro/Psych: Uneventful perioperative course   Sign Out Status: Preoperative baseline; Age appropriate mentation     Airway/Resp.: Uneventful perioperative course   Sign Out Status: Non labored breathing, age appropriate RR; Resp. Status within EXPECTED Parameters     CV: Uneventful perioperative course   Sign Out status: Appropriate BP and perfusion indices; Appropriate HR/Rhythm     Disposition:   Sign Out in:  PACU  Disposition:  Phase II; Home  Recovery Course: Uneventful  Follow-Up: Not required     Comments/Narrative:  Pt desats to 88-90% in Phase two sitting up in a chair on room air.  This is probably the pt's baseline. Pt does have MICHA and does use his CPAP every night.  Pt and family are upbeat and making jokes in Phase two. I checked all his lung fields, they are all CTA. He may go home.           Last Anesthesia Record Vitals:  CRNA VITALS  2019 1359 - 2019 1459      2019             Temp:  19  C (66.2  F)  (Abnormal)           Last PACU Vitals:  Vitals Value Taken Time   /66 2019  3:45 PM   Temp 36.7  C (98.1  F) 2019  3:45 PM   Pulse 86 2019  3:30 PM   Resp 23 2019  3:45 PM   SpO2 92 % 2019  3:45 PM   Temp src     NIBP     Pulse     SpO2     Resp     Temp 19  C (66.2  F) 2019  2:29 PM   Ht Rate     Temp 2           Electronically Signed By: Lizzette Hamm MD, 2019, 4:36 PM

## 2019-06-04 NOTE — DISCHARGE INSTRUCTIONS
Caring for Your Alphonse-De La O Drain    You have been discharged with a Alphonse-De La O drainage tube. This tube drains fluid from your incision, helping prevent swelling and reducing the risk for infection. The tube is held in place by a few stitches. The drain will be removed when your doctor determines you no longer need it and when the amount of drainage decreases. The color and amount of fluid varies. Right after surgery, the fluid may be bright red and may become clearer over time.   Dressing:    Keep the skin around the tube dry.    If you have a dressing, change it every day.   o Wash your hands.  o Remove the old bandage. Do not use scissors-you may accidentally cut the tube.  o Check for any redness, swelling, drainage, or broken stitches. (Call your doctor with any of these findings).  o Wash your hands again.  o Wet a cotton swab (Q-tip) and clean around the incision and the tube site. Use normal saline solution (salt and water) or soap and water. Start at the tube site and move outward in a circular motion.   o Pat dry.  o Put a new bandage on the incision and tube site. Make the bandage large enough to cover the whole incision area.   o Tape the bandage in place.  o Throw out old materials and wash your hands.   Home Care:    Tape the tube to the skin below the bandage. Make sure to keep some slack in the tube to keep it from pulling out.     DO NOT sleep on the same side as the tube.    Secure the tube and bulb inside your clothing with a safety pin. This helps keep the tube from being pulled out.     Keep the bulb compressed at all times, except when you empty it.    Empty your drain at least twice a day. Empty it more often if the drain is full.   o Lift the opening of the drain.  o Drain the fluid into a measuring cup.  o Record the amount of fluid each time you empty. Share the information with your doctor at your follow-up visit.   o Squeeze the bulb with your hands until you hear air coming out of  the bulb.  o Close the opening.     Tape plastic wrap over the bandage and tube site when you shower.      Stripping  the tube helps keep blood clots from blocking the tube.                         ONLY DO THIS IF YOUR MD INSTRUCTED YOU TO DO SO!  o Hold the tubing where it leaves the skin with one hand. This keeps it from pulling on the skin.  o Pinch the tubing with the thumb and first finger of your other hand.   o Slowly and firmly pull your thumb and first finger down the tube (squeezing the tube between your fingers). Keep squeezing the tube as you run your fingers towards the bulb. If the pulling hurts or feels like it is coming out of the skin, STOP. Begin again more gently.  o Let go of the tubing with both hands. If the tube is still blocked, repeat these steps three or four times. Make sure that the bulb is compressed so it creates suction.  When to call your doctor:    New or increased pain around the tube    Redness, warmth, or swelling around the incision or tube    Drainage that is foul smelling    Vomiting    Fever over 101 F degrees    Fluid leaking around the tube    Incision seems not to be healing    Stitches become loose    The tube falls out    Drainage that changes from light pick to dark red    A sudden increase or decrease in the amount of drainage (over 30 ml).  Your drainage record:  Date Time Bulb 1: Amount of drainage (ml or cc) Bulb 2: Amount of drainage (ml or cc) Notes                                                                                            Rev. 4/2014  Same-Day Surgery   Adult Discharge Orders & Instructions     For 24 hours after surgery:  1. Get plenty of rest.  A responsible adult must stay with you for at least 24 hours after you leave the hospital.   2. Pain medication can slow your reflexes. Do not drive or use heavy equipment.  If you have weakness or tingling, don't drive or use heavy equipment until this feeling goes away.  3. Mixing alcohol and pain  medication can cause dizziness and slow your breathing. It can even be fatal. Do not drink alcohol while taking pain medication.  4. Avoid strenuous or risky activities.  Ask for help when climbing stairs.   5. You may feel lightheaded.  If so, sit for a few minutes before standing.  Have someone help you get up.   6. If you have nausea (feel sick to your stomach), drink only clear liquids such as apple juice, ginger ale, broth or 7-Up.  Rest may also help.  Be sure to drink enough fluids.  Move to a regular diet as you feel able. Take pain medications with a small amount of solid food, such as toast or crackers, to avoid nausea.   7. A slight fever is normal. Call the doctor if your fever is over 100 F (37.7 C) (taken under the tongue) or lasts longer than 24 hours.  8. You may have a dry mouth, muscle aches, trouble sleeping or a sore throat.  These symptoms should go away after 24 hours.  9. Do not make important or legal decisions.   Pain Management:      1. Take pain medication (if prescribed) for pain as directed by your physician.        2. WARNING: If the pain medication you have been prescribed contains Tylenol  (acetaminophen), DO NOT take additional doses of Tylenol (acetaminophen).     Call your doctor for any of the followin.  Signs of infection (fever, growing tenderness at the surgery site, severe pain, a large amount of drainage or bleeding, foul-smelling drainage, redness, swelling).    2.  It has been over 8 to 10 hours since surgery and you are still not able to urinate (pee).    3.  Headache for over 24 hours.    4.  Numbness, tingling or weakness the day after surgery (if you had spinal anesthesia).  To contact a doctor, call _____________________________________ or:      [phone] and ask for the Resident On Call for:          __________________________________________ (answered 24 hours a day)      Emergency Department:  [facility] [place] [facility] Department: [phone]  [place]  Emergency Department: [phone]               Rev. 10/2014

## 2019-06-04 NOTE — OP NOTE
"June 4 2019      OPERATIVE NOTE    PREOPERATIVE DIAGNOSIS: Left spermatocele  POSTOPERATIVE DIAGNOSIS: same    PROCEDURES PERFORMED:   1. Left spermatocelectomy and hydrocelectomy    STAFF SURGEON: Jovanni Mark MD  RESIDENT(S): Pati An MD  ANESTHESIA: GET  ESTIMATED BLOOD LOSS: Minimal.   IV FLUIDS: see dictated anesthesia record  COMPLICATIONS: None.   SPECIMEN:    Left spermatocele sac    SIGNIFICANT FINDINGS:   None    BRIEF OPERATIVE INDICATIONS: Dima Albright is a 58 year old year old male with a bothersome left \"hydrocele\" that measures about 00e10m8 cm. He is a  and the discomfort sitting interferes with his work. Risks, benefits and alternatives were discussed and he elected to undergo the above named procedure.    Procedure in Detail: After informed consent was obtained, the patient was taken to the operating room and placed supine. Boots were applied, preoperative antibiotics were administered IV, anesthesia was induced, patient was intubated, and the genitals were prepped and draped.    The case was begun by injecting 8 ccs 1% Marcaine for a left sided cord block and another 5 ccs along a midline raphe. The skin was incised 5 cm longitudinal midline incision sharply. Blunt dissection and electrocautery were used to open the incision down to the tunica vaginalis. Adherent layers of dartos and spermatic fascia were opened circumferentially to better expose the tunica and mobilize more of the testicle. The tunica vaginalis was incised over the fluid collection exposing a window which was punctured and partially aspirated of clear watery fluid consistent with spermatocele. The aperture was then clamped. The adherent fascial layers of dartos and fascia were further dissected bluntly and with electrocautery allowing the testicle within the tunica vaginalis to be delivered. The spermatocele sac was dissected from the inner surface of the tunica vaginalis down to a neck which was ligated with " 4-0 Vicryl ties, and the spermatocele was excised. About 600 ccs of fluid was aspirated in total.  Another smaller epididymal head cyst was drained, the sac twisted upon itself, was suture ligated, and sharply excised.    With excision of the spermatocele the surrounding tunica vaginalis was revealed to be quite redundant creating a potential space for future hydrocele. Therefore it was further opened, everted and closed over the testicle in a running fashion from the inferior pole of the testicle to the cord (Joubalay technique), ensuring that the closure would not constrict the spermatic cord.     Care was taken to achieve hemostatis of small areas of bleeding with electrocautery along the testis, cord, and inner surface of the scrotum. The wound was irrigated with saline and hemostasis was confirmed. A drain was placed in the dependent scrotum and secured with a nylon stitch.  Dartos muscle was re-approximated using a running 3-0 Vicryl. Skin is close using 4-0 monocryl in a running horizontal mattress fashion. Another 8 ccs of local anesthetic was administered to the incisoin, 2 ccs to the drain site. A 5 ml cord block was repeated on the left.    Bacitracin, fluffs, and a supporter were applied. The patient was awoken from anesthesia without complication and transported to recovery in stable condition.         Pati An MD  PGY 2 Urology    I was present and scrubbed for the entire procedure.  Jovanni Mark MD  Urology Staff

## 2019-06-04 NOTE — OR NURSING
O2 Sats remain low 90's on Room Air, occasionally drops to 88% but comes right back up. Instructed pt on cough/deep breathing, and incentive spirometer. Lung sounds clear throughout, cough strong and productive. Pt has home CPAP with him. Pt verbalized readiness to discharge. Per Dr. Hamm ok to discharge home. Encouraged Cough/deep breathing. Pt's sister is a nurse, stated she will continue to encourage him to use incentive spirometer, cough/deep breathing, and CPAP.

## 2019-06-04 NOTE — ANESTHESIA PREPROCEDURE EVALUATION
"Anesthesia Pre-Procedure Evaluation    Patient: Dima Albright   MRN:     [id number] Gender:   male   Age:    58 year old :      1960        Preoperative Diagnosis: Left Hydrocele   Procedure(s):  LEFT SCROTAL HYDROCELECTOMY REPAIR, POSSIBLE SCROTAL DRAIN PLACEMENT     Past Medical History:   Diagnosis Date     Left hydrocele 2019     Mild hypertension      Morbid obesity (H) 3/18/2019     MICHA on CPAP 2019      History reviewed. No pertinent surgical history.       Anesthesia Evaluation     . Pt has not had prior anesthetic            ROS/MED HX    ENT/Pulmonary:     (+)sleep apnea, tobacco use, Current use 1 packs/day  uses CPAP , . .    Neurologic:  - neg neurologic ROS     Cardiovascular:     (+) Dyslipidemia, hypertension-range: 118/77, ---. : . . . :. . Previous cardiac testing date:results:date: results:ECG reviewed date:2019 results:SR date: results:          METS/Exercise Tolerance:  3 - Able to walk 1-2 blocks without stopping   Hematologic:  - neg hematologic  ROS       Musculoskeletal:  - neg musculoskeletal ROS       GI/Hepatic: Comment: Pt reports reflux, \"but much better over the last three years.\"    (+) GERD       Renal/Genitourinary: Comment: See HPI        Endo:     (+) Obesity (Morbidly obese), .      Psychiatric:  - neg psychiatric ROS       Infectious Disease:  - neg infectious disease ROS       Malignancy:      - no malignancy   Other:    - neg other ROS                     PHYSICAL EXAM:   Mental Status/Neuro: A/A/O   Airway: Facies: Challenging; Thick Neck  Mallampati: II  Mouth/Opening: Full  TM distance: > 6 cm  Neck ROM: Full   Respiratory: Auscultation: CTAB     Resp. Rate: Normal     Resp. Effort: Normal      CV: Rhythm: Regular  Rate: Age appropriate  Heart: Normal Sounds   Comments:      Dental: Normal                  Lab Results   Component Value Date    WBC 13.0 (H) 2019    HGB 16.3 2019    HCT 49.6 2019     2019     " "05/21/2019    POTASSIUM 4.3 05/21/2019    CHLORIDE 107 05/21/2019    CO2 26 05/21/2019    BUN 16 05/21/2019    CR 0.91 05/21/2019    GLC 94 05/21/2019    ESTEBAN 9.0 05/21/2019    ALBUMIN 3.6 05/21/2019    PROTTOTAL 7.7 05/21/2019    ALT 29 05/21/2019    AST 20 05/21/2019    ALKPHOS 111 05/21/2019    BILITOTAL 0.3 05/21/2019       Preop Vitals  BP Readings from Last 3 Encounters:   06/04/19 118/77   05/20/19 (!) 154/92   03/18/19 142/82    Pulse Readings from Last 3 Encounters:   06/04/19 78   05/20/19 72   03/18/19 76      Resp Readings from Last 3 Encounters:   06/04/19 18    SpO2 Readings from Last 3 Encounters:   06/04/19 95%   05/20/19 95%   03/18/19 95%      Temp Readings from Last 1 Encounters:   06/04/19 36.4  C (97.5  F) (Oral)    Ht Readings from Last 1 Encounters:   06/04/19 1.829 m (6' 0.01\")      Wt Readings from Last 1 Encounters:   06/04/19 149.5 kg (329 lb 9.4 oz)    Estimated body mass index is 44.69 kg/m  as calculated from the following:    Height as of this encounter: 1.829 m (6' 0.01\").    Weight as of this encounter: 149.5 kg (329 lb 9.4 oz).     LDA:  Peripheral IV 06/04/19 Left Lower forearm (Active)   Site Assessment WDL 6/4/2019 10:15 AM   Line Status Saline locked 6/4/2019 10:15 AM   Phlebitis Scale 0-->no symptoms 6/4/2019 10:15 AM   Infiltration Scale 0 6/4/2019 10:15 AM   Number of days: 0       ETT (adult) 8 (Active)   Number of days: 0            Assessment:   ASA SCORE: 3    NPO Status: > 6 hours since completed Solid [employer]   Documentation: H&P complete; Preop Testing complete; Consents complete   Proceeding: Proceed without further delay  Tobacco Use:  NO Active use of Tobacco/UNKNOWN Tobacco use status     Plan:   Anes. Type:  General   Pre-Induction: Midazolam IV   Induction:  IV (Standard) (Modified rapid sequence.  Bicitra preop, givien in OR.)   Airway: Oral ETT   Access/Monitoring: PIV   Maintenance: Balanced   Emergence: Procedure Site   Logistics: Same Day Surgery     Postop " Pain/Sedation Strategy:  Standard-Options: Opioids PRN     PONV Management:  Adult Risk Factors:, Postop Opioids  Prevention: Ondansetron     CONSENT: Direct conversation   Plan and risks discussed with: Patient (Sister and girlfriend with pt.)          Comments for Plan/Consent:  Pt seldom goes to the doctor.  Started lisinopril one week ago.  Morbidly obese.  Reviewed GA risks including sore throat, N/V, tooth damage, heart and lung issues.  Pt is a smoker, use LTA and albuterol.  GERD, use Bicitra. All questions answered.  Pt agrees with plan and wishes to proceed.                         Lizzette Hamm MD

## 2019-06-04 NOTE — ANESTHESIA CARE TRANSFER NOTE
Patient: Dima Albright    Procedure(s):  LEFT SCROTAL HYDROCELECTOMY REPAIR, AND SCROTAL DRAIN PLACEMENT    Diagnosis: Left Hydrocele  Diagnosis Additional Information: No value filed.    Anesthesia Type:   No value filed.     Note:  Airway :Face Mask  Patient transferred to:PACU  Handoff Report: Identifed the Patient, Identified the Reponsible Provider, Reviewed the pertinent medical history, Discussed the surgical course, Reviewed Intra-OP anesthesia mangement and issues during anesthesia, Set expectations for post-procedure period and Allowed opportunity for questions and acknowledgement of understanding      Vitals: (Last set prior to Anesthesia Care Transfer)    CRNA VITALS  6/4/2019 1405 - 6/4/2019 1435      6/4/2019             Temp:  19  C (66.2  F)  (Abnormal)                 Electronically Signed By: LUCILLE Miranda CRNA  June 4, 2019  2:35 PM

## 2019-06-04 NOTE — BRIEF OP NOTE
[facility], [place]    Brief Operative Note    Pre-operative diagnosis: Left Hydrocele  Post-operative diagnosis * No post-op diagnosis entered *  Procedure: Procedure(s):  LEFT SCROTAL HYDROCELECTOMY REPAIR, AND SCROTAL DRAIN PLACEMENT  Surgeon: Surgeon(s) and Role:     * Jovanni Mark MD - Primary     * Pati An MD - Resident - Assisting  Anesthesia: General   Estimated blood loss: Less than 10 ml  Drains: Alphonse-De [place] O  Specimens:   ID Type Source Tests Collected by Time Destination   A : LEFT SCOTAL HYROCELE  Tissue Scrotum SURGICAL PATHOLOGY EXAM Jovanni Mark MD 6/4/2019  1:23 PM      Findings:   None.  Complications: None.  Implants:  * No implants in log *

## 2019-06-07 ENCOUNTER — TELEPHONE (OUTPATIENT)
Dept: UROLOGY | Facility: CLINIC | Age: 59
End: 2019-06-07

## 2019-06-07 LAB — COPATH REPORT: NORMAL

## 2019-06-07 NOTE — TELEPHONE ENCOUNTER
Spoke with patient and sister (with patient permission as she is a nurse). 3 days s/p spermatocelectomy and hydrocelectomy of left side. States that they have not been icing much but have applied ice at present, wrap/dressing keeps falling off and has been reapplied, drain was pulled yesterday that had only approximately 8 ml of serosanguinous fluid present. Denies today: excessive drainage, increased heat, chills, fever, bruising, decreased sensation, skin changes, redness or nausea. Patient has been following activity levels as ordered. Has a history of constipation issues and has taken over the counter meds for this with success.  Concerns today are increased pain and swelling to the surgical area. Has weened the Oxycodone to 5mg p.o. Every 6 hours, taking Ibuprofen 800 mg every 8 hours. Patient is concerned that he will run out of Oxycodone over the weekend and looking for refill. Also, concerned about the increased pain and swelling noted today. Advised to rewrap the surgical area as ordered. Ice was applied to the area. Advised to place scrotum closer to body to prevent fluid accumulation. Patient states that he is not wearing athletic support. Will send this to the provider on call for advisement. Pharmacy of choice per patient is the [facility] in [place].     Medication request: Oxycodone 5mg  Sig: taking one every 6 hours   Prescription written: 6/4/19  Last filled: 6/4/19  Last Qty: 20  Pt's last office visit: 6/4/19  Next scheduled office visit: 6/19/19 with Dr Mark.  Quantity left: 4 pills   Sonia Ag, SKYLERN, RN

## 2019-06-07 NOTE — TELEPHONE ENCOUNTER
"Patient advised per Dr Mark that Swelling and bruising are common after this surgery. Advised patient that the care for this per Dr Mark is to rewrap surgical area, wear supportive underwear, ice, elevate scrotum especially when supine. Advised patient that per Dr Mark that if pain or swelling are out of control advised will need to go to the ER for evaluation. Advised that there are no miracle cures, but that it is not atypical for the surgical area to look worse before they get better. Patient states now that he is down to 2 pills. Patient states that he took 2 oxycodone recently even though was advised by both his sister and nurse to take one with tylenol over the counter to elongate the action of the oxycodone and provide pain relief in between his ibuprofen doses. Patient states he is wearing boxer shorts, does not own athletic strap, and was advised that tight fitting underwear work as well. Patient was asked and offered to review post operative directions but refusing to wear tight fitting underwear even though was advised of the risks and benefits to wearing supportive underwear. Patient stated,\" so what you're telling me is you won't refill my pain medication and that is all I need to hear\". Call was then terminated by patient. Sonia Ag RN    "

## 2019-06-10 ENCOUNTER — OFFICE VISIT (OUTPATIENT)
Dept: UROLOGY | Facility: CLINIC | Age: 59
End: 2019-06-10
Payer: COMMERCIAL

## 2019-06-10 ENCOUNTER — TELEPHONE (OUTPATIENT)
Dept: UROLOGY | Facility: CLINIC | Age: 59
End: 2019-06-10

## 2019-06-10 ENCOUNTER — ANCILLARY PROCEDURE (OUTPATIENT)
Dept: ULTRASOUND IMAGING | Facility: CLINIC | Age: 59
End: 2019-06-10
Attending: UROLOGY
Payer: COMMERCIAL

## 2019-06-10 VITALS — DIASTOLIC BLOOD PRESSURE: 79 MMHG | OXYGEN SATURATION: 96 % | HEART RATE: 92 BPM | SYSTOLIC BLOOD PRESSURE: 129 MMHG

## 2019-06-10 DIAGNOSIS — N49.2 CELLULITIS, SCROTUM: ICD-10-CM

## 2019-06-10 DIAGNOSIS — Z09 POSTOP CHECK: ICD-10-CM

## 2019-06-10 DIAGNOSIS — N49.2 CELLULITIS, SCROTUM: Primary | ICD-10-CM

## 2019-06-10 PROCEDURE — 76870 US EXAM SCROTUM: CPT | Mod: 51 | Performed by: RADIOLOGY

## 2019-06-10 PROCEDURE — 99024 POSTOP FOLLOW-UP VISIT: CPT | Performed by: UROLOGY

## 2019-06-10 PROCEDURE — 93976 VASCULAR STUDY: CPT | Performed by: RADIOLOGY

## 2019-06-10 RX ORDER — OXYCODONE AND ACETAMINOPHEN 5; 325 MG/1; MG/1
1 TABLET ORAL EVERY 6 HOURS PRN
Qty: 20 TABLET | Refills: 0 | Status: ON HOLD | OUTPATIENT
Start: 2019-06-10 | End: 2019-06-15

## 2019-06-10 ASSESSMENT — PAIN SCALES - GENERAL: PAINLEVEL: SEVERE PAIN (7)

## 2019-06-10 NOTE — TELEPHONE ENCOUNTER
Per Dr. Mark, called pt and informed him scrotal US was normal, No evidence of abscess or internal infection. Dr. Mark states pt is to keep taking antibiotics and return to clinic Wednesday for recheck as scheduled. If symptoms worsen before f/u, pt to go to ER. Pt stated understanding and had no further questions.    Maria L Souza LPN

## 2019-06-10 NOTE — PROGRESS NOTES
CC: Dima Albright is post-op from left spermatocele repair done 6d ago.    HPI: Patient is 1 wks post-op.  he has been doing poorly. No fevers or chills, but has a very painful enlarged scrotum. Pain worsening, with swelling, redness, tenderness, edema of the scrotum, shaft skin also is edematous.  No drainage from the intact incision.  No crepitus or skin necrosis.        Exam:     /79 (BP Location: Left arm, Patient Position: Sitting, Cuff Size: Adult Regular)   Pulse 92   SpO2 96%     Alert, NAD.     Respirations normal, non-labored.    Incision clean, dry, intact.  This is the picture of post-op scrotal cellulitis, however.  Pitting edema of the scrotal skin, skin erythematous, warm, tender to palpation.  No crepitus.  No evidence of Zo's gangrene. Extensive skin edema of the majority of the scrotum.    PLAN:     Augmentin prescription today.  Start ASAP.    Elevated scrotum.    Scrotal ultrasound today.    Percocet for pain.    Return to clinic 48 hours.  If not better or if scrotal ultrasound shows gas forming infection will hospitalize for IV antibiotics.    Alexander VARGAS

## 2019-06-10 NOTE — NURSING NOTE
Dima Albright's goals for this visit include:   Chief Complaint   Patient presents with     RECHECK     Post-op concern       He requests these members of his care team be copied on today's visit information: Yes    PCP: No Ref-Primary, Physician    Referring Provider:  No referring provider defined for this encounter.    /79 (BP Location: Left arm, Patient Position: Sitting, Cuff Size: Adult Regular)   Pulse 92   SpO2 96%     Do you need any medication refills at today's visit? No

## 2019-06-12 ENCOUNTER — APPOINTMENT (OUTPATIENT)
Dept: CT IMAGING | Facility: CLINIC | Age: 59
DRG: 728 | End: 2019-06-12
Attending: UROLOGY
Payer: COMMERCIAL

## 2019-06-12 ENCOUNTER — HOSPITAL ENCOUNTER (INPATIENT)
Facility: CLINIC | Age: 59
LOS: 2 days | Discharge: HOME | DRG: 728 | End: 2019-06-15
Attending: UROLOGY | Admitting: UROLOGY
Payer: COMMERCIAL

## 2019-06-12 ENCOUNTER — OFFICE VISIT (OUTPATIENT)
Dept: UROLOGY | Facility: CLINIC | Age: 59
End: 2019-06-12
Payer: COMMERCIAL

## 2019-06-12 DIAGNOSIS — Z09 POSTOP CHECK: Primary | ICD-10-CM

## 2019-06-12 DIAGNOSIS — L03.818 CELLULITIS OF OTHER SPECIFIED SITE: ICD-10-CM

## 2019-06-12 DIAGNOSIS — N49.2 CELLULITIS OF SCROTUM: Primary | ICD-10-CM

## 2019-06-12 PROBLEM — L03.90 CELLULITIS: Status: ACTIVE | Noted: 2019-06-12

## 2019-06-12 LAB
ANION GAP SERPL CALCULATED.3IONS-SCNC: 4 MMOL/L (ref 3–14)
BUN SERPL-MCNC: 17 MG/DL (ref 7–30)
CALCIUM SERPL-MCNC: 9.1 MG/DL (ref 8.5–10.1)
CHLORIDE SERPL-SCNC: 106 MMOL/L (ref 94–109)
CO2 SERPL-SCNC: 27 MMOL/L (ref 20–32)
CREAT SERPL-MCNC: 1 MG/DL (ref 0.66–1.25)
ERYTHROCYTE [DISTWIDTH] IN BLOOD BY AUTOMATED COUNT: 13.2 % (ref 10–15)
GFR SERPL CREATININE-BSD FRML MDRD: 84 ML/MIN/{1.73_M2}
GLUCOSE SERPL-MCNC: 133 MG/DL (ref 70–99)
HCT VFR BLD AUTO: 45.8 % (ref 40–53)
HGB BLD-MCNC: 14.4 G/DL (ref 13.3–17.7)
MCH RBC QN AUTO: 30 PG (ref 26.5–33)
MCHC RBC AUTO-ENTMCNC: 31.4 G/DL (ref 31.5–36.5)
MCV RBC AUTO: 95 FL (ref 78–100)
PLATELET # BLD AUTO: 377 10E9/L (ref 150–450)
POTASSIUM SERPL-SCNC: 4.2 MMOL/L (ref 3.4–5.3)
RADIOLOGIST FLAGS: ABNORMAL
RBC # BLD AUTO: 4.8 10E12/L (ref 4.4–5.9)
SODIUM SERPL-SCNC: 138 MMOL/L (ref 133–144)
WBC # BLD AUTO: 13.9 10E9/L (ref 4–11)

## 2019-06-12 PROCEDURE — 40000141 ZZH STATISTIC PERIPHERAL IV START W/O US GUIDANCE

## 2019-06-12 PROCEDURE — G0378 HOSPITAL OBSERVATION PER HR: HCPCS

## 2019-06-12 PROCEDURE — 25800030 ZZH RX IP 258 OP 636: Performed by: UROLOGY

## 2019-06-12 PROCEDURE — 40000275 ZZH STATISTIC RCP TIME EA 10 MIN: Performed by: OPTOMETRIST

## 2019-06-12 PROCEDURE — 87077 CULTURE AEROBIC IDENTIFY: CPT | Performed by: UROLOGY

## 2019-06-12 PROCEDURE — 87040 BLOOD CULTURE FOR BACTERIA: CPT | Performed by: UROLOGY

## 2019-06-12 PROCEDURE — 96376 TX/PRO/DX INJ SAME DRUG ADON: CPT

## 2019-06-12 PROCEDURE — 80048 BASIC METABOLIC PNL TOTAL CA: CPT | Performed by: UROLOGY

## 2019-06-12 PROCEDURE — 85027 COMPLETE CBC AUTOMATED: CPT | Performed by: UROLOGY

## 2019-06-12 PROCEDURE — 96375 TX/PRO/DX INJ NEW DRUG ADDON: CPT

## 2019-06-12 PROCEDURE — 87800 DETECT AGNT MULT DNA DIREC: CPT | Performed by: UROLOGY

## 2019-06-12 PROCEDURE — 72193 CT PELVIS W/DYE: CPT

## 2019-06-12 PROCEDURE — 99024 POSTOP FOLLOW-UP VISIT: CPT | Performed by: UROLOGY

## 2019-06-12 PROCEDURE — 25000128 H RX IP 250 OP 636: Performed by: STUDENT IN AN ORGANIZED HEALTH CARE EDUCATION/TRAINING PROGRAM

## 2019-06-12 PROCEDURE — 96374 THER/PROPH/DIAG INJ IV PUSH: CPT | Mod: 59

## 2019-06-12 PROCEDURE — 25000128 H RX IP 250 OP 636: Performed by: UROLOGY

## 2019-06-12 PROCEDURE — 94660 CPAP INITIATION&MGMT: CPT | Performed by: OPTOMETRIST

## 2019-06-12 PROCEDURE — 36415 COLL VENOUS BLD VENIPUNCTURE: CPT | Performed by: UROLOGY

## 2019-06-12 PROCEDURE — 25000132 ZZH RX MED GY IP 250 OP 250 PS 637: Performed by: STUDENT IN AN ORGANIZED HEALTH CARE EDUCATION/TRAINING PROGRAM

## 2019-06-12 PROCEDURE — 25000132 ZZH RX MED GY IP 250 OP 250 PS 637: Performed by: UROLOGY

## 2019-06-12 PROCEDURE — 87186 SC STD MICRODIL/AGAR DIL: CPT | Performed by: UROLOGY

## 2019-06-12 RX ORDER — IOPAMIDOL 755 MG/ML
135 INJECTION, SOLUTION INTRAVASCULAR ONCE
Status: COMPLETED | OUTPATIENT
Start: 2019-06-12 | End: 2019-06-12

## 2019-06-12 RX ORDER — NALOXONE HYDROCHLORIDE 0.4 MG/ML
.1-.4 INJECTION, SOLUTION INTRAMUSCULAR; INTRAVENOUS; SUBCUTANEOUS
Status: DISCONTINUED | OUTPATIENT
Start: 2019-06-12 | End: 2019-06-15 | Stop reason: HOSPADM

## 2019-06-12 RX ORDER — PIPERACILLIN SODIUM, TAZOBACTAM SODIUM 3; .375 G/15ML; G/15ML
3.38 INJECTION, POWDER, LYOPHILIZED, FOR SOLUTION INTRAVENOUS EVERY 6 HOURS
Status: DISCONTINUED | OUTPATIENT
Start: 2019-06-12 | End: 2019-06-14

## 2019-06-12 RX ORDER — HYDROMORPHONE HCL/0.9% NACL/PF 0.2MG/0.2
0.2 SYRINGE (ML) INTRAVENOUS EVERY 4 HOURS PRN
Status: DISCONTINUED | OUTPATIENT
Start: 2019-06-12 | End: 2019-06-15 | Stop reason: HOSPADM

## 2019-06-12 RX ORDER — ACETAMINOPHEN 325 MG/1
650 TABLET ORAL EVERY 4 HOURS
Status: DISCONTINUED | OUTPATIENT
Start: 2019-06-12 | End: 2019-06-15 | Stop reason: HOSPADM

## 2019-06-12 RX ORDER — OXYCODONE HYDROCHLORIDE 5 MG/1
5-10 TABLET ORAL EVERY 4 HOURS PRN
Status: DISCONTINUED | OUTPATIENT
Start: 2019-06-12 | End: 2019-06-15 | Stop reason: HOSPADM

## 2019-06-12 RX ORDER — IOPAMIDOL 755 MG/ML
135 INJECTION, SOLUTION INTRAVASCULAR ONCE
Status: DISCONTINUED | OUTPATIENT
Start: 2019-06-12 | End: 2019-06-12

## 2019-06-12 RX ORDER — ONDANSETRON 4 MG/1
4 TABLET, ORALLY DISINTEGRATING ORAL EVERY 6 HOURS PRN
Status: DISCONTINUED | OUTPATIENT
Start: 2019-06-12 | End: 2019-06-15 | Stop reason: HOSPADM

## 2019-06-12 RX ORDER — ONDANSETRON 2 MG/ML
4 INJECTION INTRAMUSCULAR; INTRAVENOUS EVERY 6 HOURS PRN
Status: DISCONTINUED | OUTPATIENT
Start: 2019-06-12 | End: 2019-06-15 | Stop reason: HOSPADM

## 2019-06-12 RX ORDER — LISINOPRIL 10 MG/1
10 TABLET ORAL DAILY
Status: DISCONTINUED | OUTPATIENT
Start: 2019-06-13 | End: 2019-06-15 | Stop reason: HOSPADM

## 2019-06-12 RX ORDER — SODIUM CHLORIDE 9 MG/ML
INJECTION, SOLUTION INTRAVENOUS CONTINUOUS
Status: DISCONTINUED | OUTPATIENT
Start: 2019-06-12 | End: 2019-06-15 | Stop reason: HOSPADM

## 2019-06-12 RX ORDER — AMOXICILLIN 250 MG
2 CAPSULE ORAL 2 TIMES DAILY
Status: DISCONTINUED | OUTPATIENT
Start: 2019-06-12 | End: 2019-06-15 | Stop reason: HOSPADM

## 2019-06-12 RX ORDER — AMOXICILLIN 250 MG
1 CAPSULE ORAL 2 TIMES DAILY
Status: DISCONTINUED | OUTPATIENT
Start: 2019-06-12 | End: 2019-06-15 | Stop reason: HOSPADM

## 2019-06-12 RX ADMIN — Medication 0.2 MG: at 21:14

## 2019-06-12 RX ADMIN — SENNOSIDES AND DOCUSATE SODIUM 1 TABLET: 8.6; 5 TABLET ORAL at 19:46

## 2019-06-12 RX ADMIN — IOPAMIDOL 135 ML: 755 INJECTION, SOLUTION INTRAVENOUS at 19:24

## 2019-06-12 RX ADMIN — OXYCODONE HYDROCHLORIDE 5 MG: 5 TABLET ORAL at 19:46

## 2019-06-12 RX ADMIN — GENTAMICIN SULFATE 640 MG: 40 INJECTION, SOLUTION INTRAMUSCULAR; INTRAVENOUS at 18:58

## 2019-06-12 RX ADMIN — ACETAMINOPHEN 650 MG: 325 TABLET, FILM COATED ORAL at 19:46

## 2019-06-12 RX ADMIN — ACETAMINOPHEN 650 MG: 325 TABLET, FILM COATED ORAL at 15:45

## 2019-06-12 RX ADMIN — Medication 0.2 MG: at 15:54

## 2019-06-12 RX ADMIN — PIPERACILLIN SODIUM,TAZOBACTAM SODIUM 3.38 G: 3; .375 INJECTION, POWDER, FOR SOLUTION INTRAVENOUS at 22:48

## 2019-06-12 RX ADMIN — OXYCODONE HYDROCHLORIDE 5 MG: 5 TABLET ORAL at 18:38

## 2019-06-12 RX ADMIN — VANCOMYCIN HYDROCHLORIDE 2000 MG: 10 INJECTION, POWDER, LYOPHILIZED, FOR SOLUTION INTRAVENOUS at 16:38

## 2019-06-12 RX ADMIN — NICOTINE 1 PATCH: 7 PATCH TRANSDERMAL at 16:58

## 2019-06-12 RX ADMIN — SODIUM CHLORIDE: 9 INJECTION, SOLUTION INTRAVENOUS at 15:44

## 2019-06-12 ASSESSMENT — ACTIVITIES OF DAILY LIVING (ADL)
FALL_HISTORY_WITHIN_LAST_SIX_MONTHS: NO
SWALLOWING: 0-->SWALLOWS FOODS/LIQUIDS WITHOUT DIFFICULTY
BATHING: 0-->INDEPENDENT
DRESS: 0-->INDEPENDENT
TRANSFERRING: 0-->INDEPENDENT
RETIRED_COMMUNICATION: 0-->UNDERSTANDS/COMMUNICATES WITHOUT DIFFICULTY
COGNITION: 0 - NO COGNITION ISSUES REPORTED
AMBULATION: 0-->INDEPENDENT
TOILETING: 0-->INDEPENDENT
RETIRED_EATING: 0-->INDEPENDENT

## 2019-06-12 ASSESSMENT — COLUMBIA-SUICIDE SEVERITY RATING SCALE - C-SSRS
2. HAVE YOU ACTUALLY HAD ANY THOUGHTS OF KILLING YOURSELF IN THE PAST MONTH?: NO
1. IN THE PAST MONTH, HAVE YOU WISHED YOU WERE DEAD OR WISHED YOU COULD GO TO SLEEP AND NOT WAKE UP?: NO

## 2019-06-12 NOTE — PHARMACY-VANCOMYCIN DOSING SERVICE
Pharmacy Vancomycin Initial Note  Date of Service 2019  Patient's  1960  58 year old, male,  kg (from 19)    Indication: Skin and Soft Tissue Infection, c/f scrotal cellulitis    Current estimated CrCl = Estimated Creatinine Clearance: 136.4 mL/min (based on SCr of 0.91 mg/dL).    Creatinine for last 3 days  No results found for requested labs within last 72 hours.    Recent Vancomycin Level(s) for last 3 days  No results found for requested labs within last 72 hours.      Vancomycin IV Administrations (past 72 hours)      No vancomycin orders with administrations in past 72 hours.                Nephrotoxins and other renal medications (From now, onward)    Start     Dose/Rate Route Frequency Ordered Stop    19 0800  lisinopril (PRINIVIL/ZESTRIL) tablet 10 mg      10 mg Oral DAILY 19 1439            Contrast Orders - past 72 hours (72h ago, onward)    None                Plan:  1.  Start vancomycin  2000 mg IV q12h (13.3 mg/kg). Dosing is intentionally conservative given that pt will be simultaneously initiated on IV gentamicin for the same condiation.  2.  Goal Trough Level: 10-15 mg/L   3.  Pharmacy will check trough levels as appropriate in 1-3 Days.    4. Serum creatinine levels will be ordered daily for the first week of therapy and at least twice weekly for subsequent weeks.    5. [place] method utilized to dose vancomycin therapy: Method 2    Napoleon Kahn, BeckiD, BCPS

## 2019-06-12 NOTE — H&P
Urology Consult    Name: Dima Albright    MRN: [id number]   YOB: 1960       We were asked to see Dima Albright for evaluation and treatment of the following chief complaint.        Chief Complaint:   Scrotal pain and swelling    History is obtained from the patient          History of Present Illness:   Dima Albright is a 58 year old male with a  PMH of hypertension who recently underwent left spermatocele repair (6/4/19) who is now admitted with worsening scrotal pain, swelling and erythema consistent with cellulitis.  The patient initially returned to clinic 2 days ago with pain and scrotal swelling.  US at that time revealed no evidence of an underlying abscess and exam revealed no evidence of an underlying necrotizing infection.  He was started on augmentin for treatment of cellulitis and discharge to home.  The patient returned today to clinic with worsening findings on exam prompting the present admission.    Patient denies fevers and chills.  He continues to void without difficulty.  The patient notes no dysuria or increased urinary urgency of frequency.  He continues to tolerate a regular diet without nausea, vomiting or diarrhea.            Past Medical History:     Past Medical History:   Diagnosis Date     Left hydrocele 5/20/2019     Mild hypertension      Morbid obesity (H) 3/18/2019     MICHA on CPAP 5/20/2019            Past Surgical History:     Past Surgical History:   Procedure Laterality Date     HYDROCELECTOMY SCROTAL Left 6/4/2019    Procedure: LEFT SCROTAL HYDROCELECTOMY REPAIR, AND SCROTAL DRAIN PLACEMENT;  Surgeon: Jovanni Mark MD;  Location:  OR            Social History:     Social History     Tobacco Use     Smoking status: Current Every Day Smoker     Smokeless tobacco: Former User   Substance Use Topics     Alcohol use: Not on file            Family History:   No family history on file.         Allergies:   No Known Allergies         Medications:     Current  Facility-Administered Medications   Medication     acetaminophen (TYLENOL) tablet 650 mg     HYDROmorphone (DILAUDID) injection 0.2 mg     [START ON 6/13/2019] lisinopril (PRINIVIL/ZESTRIL) tablet 10 mg     melatonin tablet 1 mg     naloxone (NARCAN) injection 0.1-0.4 mg     ondansetron (ZOFRAN-ODT) ODT tab 4 mg    Or     ondansetron (ZOFRAN) injection 4 mg     oxyCODONE (ROXICODONE) tablet 5-10 mg     senna-docusate (SENOKOT-S/PERICOLACE) 8.6-50 MG per tablet 1 tablet    Or     senna-docusate (SENOKOT-S/PERICOLACE) 8.6-50 MG per tablet 2 tablet     sodium chloride 0.9% infusion             Review of Systems:    ROS: 10 point ROS neg other than the symptoms noted above in the HPI           Physical Exam:   VS:  T: 97.8    HR: 82    BP: 119/60    RR: 16   GEN:  AOx3.  NAD.    CV:  RRR  LUNGS: Non-labored breathing.   BACK:  No midline or CVA tenderness.  ABD:  Soft, obese abdomen/ NT.  ND.  No rebound or guarding.  No masses.  :  Circumcised penis partially buried with surrounding edema - non-erythematous; Inguinal exam reveals no extension of erythema, crepitus, tenderness or lymphadenopathy; Scrotum notably enlarged with diffuse erythema on visual inspection; Palpation of right hemiscortum reveals superficial pitting edema and a moderate hydrocele - testis palpable with minimal tenderness; Midline incision intact and well healed without drainage; Left hemiscrotum with prominent impression without drainage or skin breakdown - notable tenderness on exam with superficial edema - no fluctuance - unable to palpable testis.  Unremarkable perineal exam  s.  EXT:  Warm, well perfused.   SKIN:  Warm.  Dry.  No rashes.  NEURO:  CN grossly intact.            Data:   All laboratory data reviewed:    No lab results found in last 7 days.  No lab results found in last 7 days.  No lab results found in last 7 days.    Invalid input(s): URINEBLOOD    All pertinent imaging reviewed:    Results for orders placed or performed in  visit on 06/10/19   US Testicular & Scrotum w Doppler Ltd    Narrative    EXAMINATION: US TESTICULAR AND SCROTUM WITH DOPPLER LIMITED, 6/10/2019  1:16 PM     COMPARISON: 3/19/2019    HISTORY: Scrotal swelling, edema, erythema, and pain one week after  spermatocele surgery.     TECHNIQUE: The was scanned in standard fashion with specialized  ultrasound transducer(s) using both grey scale and limited color  Doppler techniques.    Findings:  The testes demonstrate normal and symmetric echotexture and  vascularity. No evidence of a focal lesion.  The right testicle  measures 4.4 x 3.5 x 2.9 cm and the left measures 4.3 x 2.7 x 3.1 cm .      There are small to medium sized bilateral hydroceles. In the right  epididymis, there is a 1.6 cm cyst.    Superior to the left testicle, there is a complicated collection which  is probably due to postsurgical change. However superimposed on this  collection moving superiorly to the inguinal canal, there is a  swirling appearance of the blood vessels and fat. The spermatic is  redundant into the scrotal sac. There is retained blood flow. The skin  of the left scrotal sac is thickened. No gas is visualized.      Impression    Impression:   1.  The internal contents of left scrotal sac has a swirled appearance  superior to the left testicle which may represent distended spermatic  cord and fat.  2.  Small to medium bilateral hydroceles.   3.  Small complicated changes in the left scrotal sac which most  likely represents post surgical change. Clinical follow-up and  management is recommended.     These findings were discussed with Dr. Mark by telephone on 6/10/2019  2:04 PM.    MELI LEVI MD                Impression and Plan:   Impression:   Dima Albright is a 58 year old man presents with diffuse scrotal cellulitis following recent spermatocele repair.  The patient remains afebrile and hemodynamically stable.  Plan to admit for further observation and evaluation for possible  underlying abscess.        N - Acetaminophen and prn narcotics for pain  CV - HDS - home lisinopril ordered for tomorrow  Pulm - CPAP ordered  FEN/GI - Regular diet pending CT; NS @ 100/hr; Stool softener ordered   - Apparent scrotal cellulitis - CT pending to evaluate for associated abscess; Exam not consistent with necrotizing infection at this time; Continue to monitor voiding.  Scrotal elevation with towel.  ID - Blood cultures x 2, CBC pending, Start on vancomycin and gentamycin  Activity - As tolerated  Dispo - Obs    This patient's exam findings, labs, and imaging discussed with urology staff surgeon Dr. Mark, who developed the treatment plan.    Jonnathan Orlando MD  Urology Resident

## 2019-06-12 NOTE — PLAN OF CARE
/60   Pulse 82   Temp 97.8  F (36.6  C) (Oral)   Resp 16   SpO2 94%     Pt arrived to unit from clinic around 2PM. A&Ox4. Sats 94% on RA. LS clear.  Pt C/O of tenderness and pain in scrotal area. Area reddened and swollen. Pt reports last BM was this morning. Pt's sister at bedside. Team paged to come assess. Continue plan of care.     Observation Goal:  Resolution of pain and improvement of cellulitis: Not met

## 2019-06-12 NOTE — PHARMACY-ANTICOAGULATION SERVICE
Pharmacy Aminoglycoside Initial Note  Date of Service 2019  Patient's  1960  58 year old, male    Weight (Adjusted):  106.2 kg (BMI ~44)    Indication: Skin and Soft Tissue Infection, c/f scrotal cellulitis    Current estimated CrCl = Estimated Creatinine Clearance: 136.4 mL/min (based on SCr of 0.91 mg/dL).    Creatinine for last 3 days  No results found for requested labs within last 72 hours.     Nephrotoxins and other renal medications (From now, onward)    Start     Dose/Rate Route Frequency Ordered Stop    19 0800  lisinopril (PRINIVIL/ZESTRIL) tablet 10 mg      10 mg Oral DAILY 19 1439      19 1500  vancomycin (VANCOCIN) 2,000 mg in sodium chloride 0.9 % 500 mL intermittent infusion      2,000 mg  over 2 Hours Intravenous EVERY 12 HOURS 19 1456            Contrast Orders - past 72 hours (72h ago, onward)    None          Aminoglycoside Levels - past 2 days  No results found for requested labs within last 48 hours.    Aminoglycosides IV Administrations (past 72 hours)      No aminoglycosides orders with administrations in past 72 hours.                    Plan:  1.  Start Gentamicin 640 mg (6 mg/kg) IV q24h. Dosing is intentionally lower than recommended starting dose (7 mg/kg) per [place] policy given that pt will be concurrently receiving vancomycin q12h for the same indication.  2.  Target goals based on extended interval dosing  3.  Goal peak level: 17-24 mg/L  4.  Goal trough level: <0.5mg/L  5.  Pharmacy will continue to follow and check levels as appropriate in 1-3 Days      Napoleon Kahn, BeckiD, BCPS

## 2019-06-12 NOTE — PROGRESS NOTES
CC: Dima Albright is post-op from left spermatocele repair done 6d ago.    HPI: Patient is 1 wks post-op.  he has been doing poorly. No fevers or chills, but has a very painful enlarged scrotum. Pain worsening, with swelling, redness, tenderness, edema of the scrotum, shaft skin also is edematous.  No drainage from the intact incision.  No crepitus or skin necrosis.    Scrotal ultrasound from 6/10/19 shows a little hydrocele fluid, likely reactive, and no abscess.    Exam:      Alert, NAD.     Respirations normal, non-labored.    Incision clean, dry, intact.  This is the picture of post-op scrotal cellulitis, however.  Pitting edema of the scrotal skin, skin erythematous, warm, tender to palpation.  No crepitus.  No evidence of Zo's gangrene. Extensive skin edema of the majority of the scrotum, especially anteriorly.  This swelling is worse than 48 hours ago, despite Augmentin PO x 48hrs, so he needs hospital admission.    PLAN:     Elevate scrotum.    Scrotal ultrasound today.    Percocet for pain.    Transfer [facility] at [facility] today.    Alexander VARGAS

## 2019-06-12 NOTE — PHARMACY-AMINOGLYCOSIDE DOSING SERVICE
Pharmacy Aminoglycoside Initial Note  Date of Service 2019  Patient's  1960  58 year old, male    Weight (Adjusted):  106.2 kg (BMI ~44)    Indication: Skin and Soft Tissue Infection, c/f scrotal cellulitis    Current estimated CrCl = Estimated Creatinine Clearance: 136.4 mL/min (based on SCr of 0.91 mg/dL).    Creatinine for last 3 days  No results found for requested labs within last 72 hours.     Nephrotoxins and other renal medications (From now, onward)    Start     Dose/Rate Route Frequency Ordered Stop    19 0800  lisinopril (PRINIVIL/ZESTRIL) tablet 10 mg      10 mg Oral DAILY 19 1439      19 1500  vancomycin (VANCOCIN) 2,000 mg in sodium chloride 0.9 % 500 mL intermittent infusion      2,000 mg  over 2 Hours Intravenous EVERY 12 HOURS 19 1456            Contrast Orders - past 72 hours (72h ago, onward)    None          Aminoglycoside Levels - past 2 days  No results found for requested labs within last 48 hours.    Aminoglycosides IV Administrations (past 72 hours)      No aminoglycosides orders with administrations in past 72 hours.                    Plan:  1.  Start Gentamicin 640 mg (6 mg/kg) IV q24h. Dosing is intentionally lower than recommended starting dose (7 mg/kg) per [place] policy given that pt will be concurrently receiving vancomycin q12h for the same indication.  2.  Target goals based on extended interval dosing  3.  Goal peak level: 17-24 mg/L  4.  Goal trough level: <0.5mg/L  5.  Pharmacy will continue to follow and check levels as appropriate in 1-3 Days      Napoleon Kahn, BeckiD, BCPS

## 2019-06-13 PROBLEM — N49.2 CELLULITIS OF SCROTUM: Status: ACTIVE | Noted: 2019-06-13

## 2019-06-13 LAB
ANION GAP SERPL CALCULATED.3IONS-SCNC: 5 MMOL/L (ref 3–14)
BASOPHILS # BLD AUTO: 0 10E9/L (ref 0–0.2)
BASOPHILS NFR BLD AUTO: 0.3 %
BUN SERPL-MCNC: 18 MG/DL (ref 7–30)
CALCIUM SERPL-MCNC: 8.8 MG/DL (ref 8.5–10.1)
CHLORIDE SERPL-SCNC: 109 MMOL/L (ref 94–109)
CO2 SERPL-SCNC: 26 MMOL/L (ref 20–32)
CREAT SERPL-MCNC: 1 MG/DL (ref 0.66–1.25)
CRP SERPL-MCNC: 48 MG/L (ref 0–8)
DIFFERENTIAL METHOD BLD: ABNORMAL
EOSINOPHIL # BLD AUTO: 0.6 10E9/L (ref 0–0.7)
EOSINOPHIL NFR BLD AUTO: 4.9 %
ERYTHROCYTE [DISTWIDTH] IN BLOOD BY AUTOMATED COUNT: 13.3 % (ref 10–15)
ERYTHROCYTE [DISTWIDTH] IN BLOOD BY AUTOMATED COUNT: 13.4 % (ref 10–15)
GFR SERPL CREATININE-BSD FRML MDRD: 84 ML/MIN/{1.73_M2}
GLUCOSE SERPL-MCNC: 104 MG/DL (ref 70–99)
HBA1C MFR BLD: 6.4 % (ref 0–5.6)
HCT VFR BLD AUTO: 43.7 % (ref 40–53)
HCT VFR BLD AUTO: 44.3 % (ref 40–53)
HGB BLD-MCNC: 13.3 G/DL (ref 13.3–17.7)
HGB BLD-MCNC: 13.8 G/DL (ref 13.3–17.7)
IMM GRANULOCYTES # BLD: 0.1 10E9/L (ref 0–0.4)
IMM GRANULOCYTES NFR BLD: 0.6 %
INR PPP: 0.96 (ref 0.86–1.14)
LYMPHOCYTES # BLD AUTO: 2.9 10E9/L (ref 0.8–5.3)
LYMPHOCYTES NFR BLD AUTO: 24.6 %
MCH RBC QN AUTO: 29.9 PG (ref 26.5–33)
MCH RBC QN AUTO: 30 PG (ref 26.5–33)
MCHC RBC AUTO-ENTMCNC: 30.4 G/DL (ref 31.5–36.5)
MCHC RBC AUTO-ENTMCNC: 31.2 G/DL (ref 31.5–36.5)
MCV RBC AUTO: 96 FL (ref 78–100)
MCV RBC AUTO: 99 FL (ref 78–100)
MONOCYTES # BLD AUTO: 0.9 10E9/L (ref 0–1.3)
MONOCYTES NFR BLD AUTO: 7.8 %
NEUTROPHILS # BLD AUTO: 7.3 10E9/L (ref 1.6–8.3)
NEUTROPHILS NFR BLD AUTO: 61.8 %
NRBC # BLD AUTO: 0 10*3/UL
NRBC BLD AUTO-RTO: 0 /100
PLATELET # BLD AUTO: 349 10E9/L (ref 150–450)
PLATELET # BLD AUTO: 364 10E9/L (ref 150–450)
POTASSIUM SERPL-SCNC: 4.3 MMOL/L (ref 3.4–5.3)
RBC # BLD AUTO: 4.43 10E12/L (ref 4.4–5.9)
RBC # BLD AUTO: 4.62 10E12/L (ref 4.4–5.9)
SODIUM SERPL-SCNC: 139 MMOL/L (ref 133–144)
WBC # BLD AUTO: 11.3 10E9/L (ref 4–11)
WBC # BLD AUTO: 11.8 10E9/L (ref 4–11)

## 2019-06-13 PROCEDURE — 85027 COMPLETE CBC AUTOMATED: CPT | Performed by: UROLOGY

## 2019-06-13 PROCEDURE — 36415 COLL VENOUS BLD VENIPUNCTURE: CPT | Performed by: UROLOGY

## 2019-06-13 PROCEDURE — 40000275 ZZH STATISTIC RCP TIME EA 10 MIN

## 2019-06-13 PROCEDURE — 25000128 H RX IP 250 OP 636: Performed by: UROLOGY

## 2019-06-13 PROCEDURE — 12000001 ZZH R&B MED SURG/OB UMMC

## 2019-06-13 PROCEDURE — 25800030 ZZH RX IP 258 OP 636: Performed by: UROLOGY

## 2019-06-13 PROCEDURE — 86140 C-REACTIVE PROTEIN: CPT | Performed by: UROLOGY

## 2019-06-13 PROCEDURE — 85610 PROTHROMBIN TIME: CPT | Performed by: RADIOLOGY

## 2019-06-13 PROCEDURE — 25000128 H RX IP 250 OP 636: Performed by: STUDENT IN AN ORGANIZED HEALTH CARE EDUCATION/TRAINING PROGRAM

## 2019-06-13 PROCEDURE — 96376 TX/PRO/DX INJ SAME DRUG ADON: CPT

## 2019-06-13 PROCEDURE — 80048 BASIC METABOLIC PNL TOTAL CA: CPT | Performed by: UROLOGY

## 2019-06-13 PROCEDURE — 36415 COLL VENOUS BLD VENIPUNCTURE: CPT | Performed by: RADIOLOGY

## 2019-06-13 PROCEDURE — 25000132 ZZH RX MED GY IP 250 OP 250 PS 637: Performed by: STUDENT IN AN ORGANIZED HEALTH CARE EDUCATION/TRAINING PROGRAM

## 2019-06-13 PROCEDURE — 94660 CPAP INITIATION&MGMT: CPT

## 2019-06-13 PROCEDURE — G0378 HOSPITAL OBSERVATION PER HR: HCPCS

## 2019-06-13 PROCEDURE — 85025 COMPLETE CBC W/AUTO DIFF WBC: CPT | Performed by: RADIOLOGY

## 2019-06-13 PROCEDURE — 83036 HEMOGLOBIN GLYCOSYLATED A1C: CPT | Performed by: UROLOGY

## 2019-06-13 PROCEDURE — 25000132 ZZH RX MED GY IP 250 OP 250 PS 637: Performed by: UROLOGY

## 2019-06-13 RX ORDER — DEXTROSE MONOHYDRATE 25 G/50ML
25-50 INJECTION, SOLUTION INTRAVENOUS
Status: DISCONTINUED | OUTPATIENT
Start: 2019-06-13 | End: 2019-06-14 | Stop reason: HOSPADM

## 2019-06-13 RX ORDER — NICOTINE POLACRILEX 4 MG
15-30 LOZENGE BUCCAL
Status: DISCONTINUED | OUTPATIENT
Start: 2019-06-13 | End: 2019-06-14 | Stop reason: HOSPADM

## 2019-06-13 RX ORDER — LIDOCAINE 40 MG/G
CREAM TOPICAL
Status: DISCONTINUED | OUTPATIENT
Start: 2019-06-13 | End: 2019-06-14 | Stop reason: HOSPADM

## 2019-06-13 RX ADMIN — ACETAMINOPHEN 650 MG: 325 TABLET, FILM COATED ORAL at 16:32

## 2019-06-13 RX ADMIN — OXYCODONE HYDROCHLORIDE 10 MG: 5 TABLET ORAL at 21:56

## 2019-06-13 RX ADMIN — OXYCODONE HYDROCHLORIDE 10 MG: 5 TABLET ORAL at 14:00

## 2019-06-13 RX ADMIN — OXYCODONE HYDROCHLORIDE 10 MG: 5 TABLET ORAL at 18:00

## 2019-06-13 RX ADMIN — VANCOMYCIN HYDROCHLORIDE 2000 MG: 10 INJECTION, POWDER, LYOPHILIZED, FOR SOLUTION INTRAVENOUS at 03:12

## 2019-06-13 RX ADMIN — PIPERACILLIN SODIUM,TAZOBACTAM SODIUM 3.38 G: 3; .375 INJECTION, POWDER, FOR SOLUTION INTRAVENOUS at 05:04

## 2019-06-13 RX ADMIN — PIPERACILLIN SODIUM,TAZOBACTAM SODIUM 3.38 G: 3; .375 INJECTION, POWDER, FOR SOLUTION INTRAVENOUS at 16:32

## 2019-06-13 RX ADMIN — PIPERACILLIN SODIUM,TAZOBACTAM SODIUM 3.38 G: 3; .375 INJECTION, POWDER, FOR SOLUTION INTRAVENOUS at 11:16

## 2019-06-13 RX ADMIN — SODIUM CHLORIDE: 9 INJECTION, SOLUTION INTRAVENOUS at 21:56

## 2019-06-13 RX ADMIN — PIPERACILLIN SODIUM,TAZOBACTAM SODIUM 3.38 G: 3; .375 INJECTION, POWDER, FOR SOLUTION INTRAVENOUS at 23:00

## 2019-06-13 RX ADMIN — ACETAMINOPHEN 650 MG: 325 TABLET, FILM COATED ORAL at 05:04

## 2019-06-13 RX ADMIN — SENNOSIDES AND DOCUSATE SODIUM 2 TABLET: 8.6; 5 TABLET ORAL at 20:07

## 2019-06-13 RX ADMIN — VANCOMYCIN HYDROCHLORIDE 2000 MG: 10 INJECTION, POWDER, LYOPHILIZED, FOR SOLUTION INTRAVENOUS at 14:00

## 2019-06-13 RX ADMIN — OXYCODONE HYDROCHLORIDE 5 MG: 5 TABLET ORAL at 01:01

## 2019-06-13 RX ADMIN — Medication 0.2 MG: at 11:16

## 2019-06-13 RX ADMIN — SODIUM CHLORIDE: 9 INJECTION, SOLUTION INTRAVENOUS at 08:22

## 2019-06-13 RX ADMIN — LISINOPRIL 10 MG: 10 TABLET ORAL at 09:01

## 2019-06-13 RX ADMIN — ACETAMINOPHEN 650 MG: 325 TABLET, FILM COATED ORAL at 11:16

## 2019-06-13 RX ADMIN — ACETAMINOPHEN 650 MG: 325 TABLET, FILM COATED ORAL at 09:01

## 2019-06-13 RX ADMIN — OXYCODONE HYDROCHLORIDE 10 MG: 5 TABLET ORAL at 09:01

## 2019-06-13 RX ADMIN — ACETAMINOPHEN 650 MG: 325 TABLET, FILM COATED ORAL at 20:07

## 2019-06-13 RX ADMIN — NICOTINE 1 PATCH: 7 PATCH TRANSDERMAL at 16:32

## 2019-06-13 RX ADMIN — OXYCODONE HYDROCHLORIDE 10 MG: 5 TABLET ORAL at 05:04

## 2019-06-13 RX ADMIN — ACETAMINOPHEN 650 MG: 325 TABLET, FILM COATED ORAL at 01:01

## 2019-06-13 ASSESSMENT — ACTIVITIES OF DAILY LIVING (ADL)
ADLS_ACUITY_SCORE: 13
ADLS_ACUITY_SCORE: 13

## 2019-06-13 NOTE — PLAN OF CARE
Temp: 96.6  F (35.9  C) Temp src: Oral BP: 121/60 Pulse: 82   Resp: 16 SpO2: 92 % O2 Device: None (Room air)       Status:admitted for scrotal edema, redness  Neuro: alert, oriented, pleasant  GI/:  voiding ind, BM today  Incisions/Drains/Skin: scrotum swollen, red, old incision  IV Access: PIV with NS at 100, intermittent antibx  Labs: CT completed, WBC 13.9, BCx2  Pain: prn oxycodone and dilaudid for breakthrough, scheduled tylenol, gave pt towels to elevate scrotum  Diet: regular, good appetite. NPO at midnight  Activity: up ad carol  New changes this shift: CT completed, IV antibx started  Plan:  continue antibx therapy, possible OR tomorrow

## 2019-06-13 NOTE — PLAN OF CARE
Ambulatory without SOB, on cpap at hs, +exp wheeze. VSS. +BS, kept on NPO for possible procedure today; scrotum is swollen, reddened, and c/o pain- Oxycodone 10 mg and scheduled Tylenol given. Voiding not saving. NS at 100 ml/hr via right piv. On Vanco and Zosyn. Continue poc.

## 2019-06-13 NOTE — CONSULTS
[facility] ID SERVICE CONSULTATION     Patient:  Dima Albright   Date of birth 1960, Medical record number [id number]  Date of Visit:  06/13/2019  Date of Admission: 6/12/2019  Consult Requester:Jovanni Mark MD            Assessment and Recommendations:   Problem list:  1. Soft tissue infection: scrotal cellulitis and abscess  2. S/p left scrotal hydrocelectomy 06/04    RECOMMENDATION:  1. Continue with current antibiotic regimen ( IV vancomycin and zosyn), with plan to deescalate depending on clinical improvement and/or when cultures results are available.  2. Agree with I & D or aspiration of scrotal abscess, please send specimens for gram stain and cultures  3. Consider trending CRP   4. Pain control per primary team  5. ID will continue to follow.    DISCUSSION  Dima Albright, 58 year old male, with significant PMH for hydrocele >10years, hypertension, obesity, MICHA on home CPAP who was admitted here last night 06/12 with worsening swelling, pain and redness of his scrotum; 9 days following left scrotal hydrocelectomy. CT pelvis with complex rim-enhancing fluid collection within the left scrotum, concerning for developing abscess. Given recent instrumentation and site of infection, patient likely has polymicrobial soft tissue infection. He is currently appropriately covered with broad spectrum antibiotics-vancomycin for possible staph, and zoysn coverage of coliforms/anearobes/enteroccoci. Ideally, the abscess should be drained if feasible, and specimens sent for cultures, as that would be helpful in narrowing down his antibiotics.    Thanks for the opportunity to take part in Mr. Albright's care-ID will continue to follow along with you.    Patient seen and discussed with Dr. Rodriguez.     Evie Benites MD, MPH  Internal Medicine PGY 1  Pager: [phone]  _____________________________________________________________    Consult Question:.Antibiotics management for scrotal cellulitis          History of Present Illness:   Dima Albright, 58 year old male, with significant PMH for hydrocele >10years, Hypertension, Obesity, MICHA on home CPAP who was admitted here last night 06/12 with worsening pain, redness and pain of his scrotum.  Per chart review and patient's report- he underwent left hydrocelectomy on 06/04. Post surgery, he noted progressively worsening pain, swelling and redness prompting clinic visit on 06/08. At that visit, a scrotal ultrasound was done with no abscess, but the exam was concerning for cellulitis- he was sent home on Augmentin. Despite antibiotics, he reported progressive symptoms and he returned to clinic on 06/12 with worsening exam findings. Referred for admission and IV antibiotics.  On admission, a CT was done- now showing a complex rim-enhancing fluid collection within the left scrotum, concerning for developing abscess. ID consulted for antibiotic guidance.    At bedside, patient denies any fevers, chills, nausea, vomiting, diarrhea or abdominal pain. No dysuria, frequency or hematuria. Appetite is good and feels like pain is controlled on current pain regimen. No known allergies to medicine.  Works as . Lives with sister in recent days in [place] in [place].    Antibiotics:  -IV vancomycin 06/12-  -IV zoysn 06/12-  -Augmentin 06/08-06/12    Recent culture results include:  Culture Micro   Date Value Ref Range Status   06/12/2019 No growth after 10 hours  Preliminary   06/12/2019 No growth after 10 hours  Preliminary     CT PELVIS  06/12/2019  IMPRESSION:   1. Complex rim-enhancing fluid collection within the left scrotum,  concerning for developing abscess, less likely hematoma. There is  substantial associated scrotal edema, without evidence of scrotal air.  Consider further evaluation with new scrotal ultrasound if clinically  indicated. No definite abscess is seen on prior ultrasound.  2. Moderate left and small right hydrocele.  3. Unchanged swirled  appearance of the internal contents of the left  scrotal sac, cannot completely rule out torsion which was not present  on ultrasound 6/10/2008.         Review of Systems:   CONSTITUTIONAL:  No fevers or chills  EYES: negative for icterus  ENT:  negative for hearing loss, tinnitus and sore throat  RESPIRATORY:  negative for cough with sputum and dyspnea  CARDIOVASCULAR:  negative for chest pain, dyspnea  GASTROINTESTINAL:  negative for nausea, vomiting, diarrhea and constipation  GENITOURINARY:  negative for dysuria  HEME:  No easy bruising  INTEGUMENT:  negative for rash and pruritus  NEURO:  Negative for headache         Past Medical History:     Past Medical History:   Diagnosis Date     Left hydrocele 5/20/2019     Mild hypertension      Morbid obesity (H) 3/18/2019     MICHA on CPAP 5/20/2019            Past Surgical History:     Past Surgical History:   Procedure Laterality Date     HYDROCELECTOMY SCROTAL Left 6/4/2019    Procedure: LEFT SCROTAL HYDROCELECTOMY REPAIR, AND SCROTAL DRAIN PLACEMENT;  Surgeon: Jovanni Mark MD;  Location: [facility] OR            Family History:   No family history on file.         Social History:     Social History     Tobacco Use     Smoking status: Current Every Day Smoker     Smokeless tobacco: Former User   Substance Use Topics     Alcohol use: Not on file     History   Sexual Activity     Sexual activity: Not on file            Current Medications (antimicrobials listed in bold):       acetaminophen  650 mg Oral Q4H     lisinopril  10 mg Oral Daily     nicotine  1 patch Transdermal Daily     nicotine   Transdermal Q8H     nicotine   Transdermal Daily     piperacillin-tazobactam  3.375 g Intravenous Q6H     senna-docusate  1 tablet Oral BID    Or     senna-docusate  2 tablet Oral BID     vancomycin (VANCOCIN) IV  2,000 mg Intravenous Q12H            Allergies:   No Known Allergies         Physical Exam:   Vitals were reviewed  Ranges for his vital signs:  Temp:  [96  F (35.6   C)-97.9  F (36.6  C)] 96.5  F (35.8  C)  Pulse:  [73-88] 77  Resp:  [16-18] 16  BP: (112-132)/(48-70) 131/55  SpO2:  [92 %-94 %] 92 %    Physical Examination:  GENERAL:  Obese,pleasant, in bed in no acute distress.   EYES:  Eyes have anicteric sclerae without conjunctival injection   ENT:  Oropharynx is moist without exudates or ulcers. Tongue is midline  NECK:  Supple. No  Cervical lymphadenopathy  LUNGS:  Clear to auscultation bilateral.   CARDIOVASCULAR:  Regular rate and rhythm with no murmurs, gallops or rubs.  ABDOMEN:  Normal bowel sounds, soft, nontender. No appreciable hepatosplenomegaly  SKIN: scaly nodular rash on forearms.  Line(s) are in place without any surrounding erythema or exudate. No stigmata of endocarditis.  NEUROLOGIC:  Grossly nonfocal. Active x4 extremities  Scrotum: Penis buried in swollen erthymematous /edematous scrotum. Left scrotum redder and feels firmer than right. No appreciable inguinal lymphadenopathy.         Laboratory Data:     Hematology Studies    Recent Labs   Lab Test 06/13/19  0646 06/12/19  1611 05/21/19  0955   WBC 11.3* 13.9* 13.0*   HGB 13.8 14.4 16.3   MCV 96 95 93    377 321       Metabolic Studies     Recent Labs   Lab Test 06/13/19  0646 06/12/19  1611 05/21/19  0955    138 138   POTASSIUM 4.3 4.2 4.3   CHLORIDE 109 106 107   CO2 26 27 26   BUN 18 17 16   CR 1.00 1.00 0.91   GFRESTIMATED 84 84 >90       Hepatic Studies    Recent Labs   Lab Test 05/21/19  0955   BILITOTAL 0.3   ALKPHOS 111   ALBUMIN 3.6   AST 20   ALT 29       Microbiology:  Culture Micro   Date Value Ref Range Status   06/12/2019 No growth after 10 hours  Preliminary   06/12/2019 No growth after 10 hours  Preliminary

## 2019-06-13 NOTE — PROVIDER NOTIFICATION
Text paged urology resident about micro call:  L hand blood culture resulted with gram + cocci in clusters.

## 2019-06-13 NOTE — PLAN OF CARE
Neuro: A0x4 able to make needs known  Cv: Wnl, HTN controlled w/ lisinopril  Pulm: No c/o SOB, some expiratory wheezes, sats mid 90's on Ra. Current smoker  GI/: Up to void ad carol, no BM - large edematous scrotum markedly red & enlarged  Diet: Reg, to be NPO ex meds + ice chips @ 0000 6/14  Incisions/Drains: scrotal inc from 6/4 spermatocelle repair  IV Access: R PIV /hr  Labs: WBC 11.3  Activity: Independent  Pain: Scrotum pain, Q4 5-10mg oxy + Q3 Dilaudid    New changes this shift: vanc & zosyn given  Plan: IR tomorrow for aspiration from scrotum, ID to evaluate, NPO @ 0000 6/14

## 2019-06-13 NOTE — CONSULTS
Patient is on IR schedule 06/14/2019 for a aspiration of fluid collection within the left scrotum,.   Labs ordered for procedure.  Orders for NPO, scrubs  have been entered.   Consent will be done prior to procedure.  Please contact the [place] RN [place] [phone] for estimated time of procedure.   left spermatocele repair now with complex rim-enhancing fluid collection within the left scrotum, .     Caro Mims IR RPA  [phone]  [phone] Call pager  [phone] pager

## 2019-06-13 NOTE — PROGRESS NOTES
Urology Daily Progress Note    24 hour events/Subjective:     - No acute events overnight, afebrile   - Kept NPO since midnight   - Persistent scrotal pain and redness   - Voiding without issue    O:  Vitals: /70 (BP Location: Right arm)   Pulse 76   Temp 97  F (36.1  C) (Oral)   Resp 18   SpO2 94%    - Temp (24hrs), Av.4  F (36.3  C), Min:96.6  F (35.9  C), Max:97.9  F (36.6  C)      General: Alert, interactive, in NAD  Resp: Non-labored breathing on RA  Abdomen: Soft, nontender, nondistended  Ext: Warm and well perfused  : Diffusely enlarged, edematous, erythematous without crepitus, without pain out of proportion. Midline incision c/d/i.    I/O  I/O last 3 completed shifts:  In: 1362 [P.O.:400; I.V.:962]  Out: -  - Last 24 hours    I/O this shift:  In: 1378.33 [P.O.:60; I.V.:1318.33]  Out: -  - Since Midnight    Labs  Pending this am    Heme:  Recent Labs   Lab 19  1611   WBC 13.9*   HGB 14.4        Chem:  Recent Labs   Lab 19  1611   POTASSIUM 4.2   CR 1.00     INRNo lab results found in last 7 days.     Assessment/Plan  58 year old y/o male admitted with scrotal cellulitis/abscess s/p spermatocelectomy/hydrocelectomy on 19.     NEURO Pain well controlled on  APAP with PRN Oxycodone and IV Dilaudid.  Changes:  None    CV HDS.    PULM Aggressive pulmonary toilet and I/S.   FEN/GI IVF: 100cc/hr while NPO  Diet: Keep NPO today ahead of possible takeback for I&D vs drain placement    Keep scrotum elevated.  CT reviewed, concern for developing abscess. Will follow exam on antibiotics to determine need for OR today.   HEME Hgb as above.    ID Afebrile, no leukocytosis.    Antibiotics: continue vanc and zosyn   ENDO No issues   ACTIVITY Up as tolerated    PPx SCDs, ambulation   DISPO 7B, dispo pending clinical improvement         Seen and examined with chief resident, to be discussed with Dr. Mark    --  Pati An MD  Urology Resident

## 2019-06-14 ENCOUNTER — APPOINTMENT (OUTPATIENT)
Dept: INTERVENTIONAL RADIOLOGY/VASCULAR | Facility: CLINIC | Age: 59
DRG: 728 | End: 2019-06-14
Attending: RADIOLOGY
Payer: COMMERCIAL

## 2019-06-14 LAB
ANION GAP SERPL CALCULATED.3IONS-SCNC: 5 MMOL/L (ref 3–14)
BUN SERPL-MCNC: 16 MG/DL (ref 7–30)
CALCIUM SERPL-MCNC: 8.8 MG/DL (ref 8.5–10.1)
CHLORIDE SERPL-SCNC: 108 MMOL/L (ref 94–109)
CO2 SERPL-SCNC: 27 MMOL/L (ref 20–32)
CREAT SERPL-MCNC: 1.08 MG/DL (ref 0.66–1.25)
CREAT SERPL-MCNC: 1.08 MG/DL (ref 0.66–1.25)
ERYTHROCYTE [DISTWIDTH] IN BLOOD BY AUTOMATED COUNT: 13.3 % (ref 10–15)
GFR SERPL CREATININE-BSD FRML MDRD: 76 ML/MIN/{1.73_M2}
GFR SERPL CREATININE-BSD FRML MDRD: 76 ML/MIN/{1.73_M2}
GLUCOSE SERPL-MCNC: 99 MG/DL (ref 70–99)
GRAM STN SPEC: NORMAL
GRAM STN SPEC: NORMAL
HCT VFR BLD AUTO: 45.2 % (ref 40–53)
HGB BLD-MCNC: 13.6 G/DL (ref 13.3–17.7)
MCH RBC QN AUTO: 30 PG (ref 26.5–33)
MCHC RBC AUTO-ENTMCNC: 30.1 G/DL (ref 31.5–36.5)
MCV RBC AUTO: 100 FL (ref 78–100)
PLATELET # BLD AUTO: 330 10E9/L (ref 150–450)
POTASSIUM SERPL-SCNC: 4.8 MMOL/L (ref 3.4–5.3)
RBC # BLD AUTO: 4.54 10E12/L (ref 4.4–5.9)
SODIUM SERPL-SCNC: 139 MMOL/L (ref 133–144)
SPECIMEN SOURCE: NORMAL
VANCOMYCIN SERPL-MCNC: 11.6 MG/L
WBC # BLD AUTO: 11.2 10E9/L (ref 4–11)

## 2019-06-14 PROCEDURE — 36415 COLL VENOUS BLD VENIPUNCTURE: CPT | Performed by: UROLOGY

## 2019-06-14 PROCEDURE — 87205 SMEAR GRAM STAIN: CPT | Performed by: PHYSICIAN ASSISTANT

## 2019-06-14 PROCEDURE — 25000128 H RX IP 250 OP 636: Performed by: PHYSICIAN ASSISTANT

## 2019-06-14 PROCEDURE — 10005 FNA BX W/US GDN 1ST LES: CPT

## 2019-06-14 PROCEDURE — 99152 MOD SED SAME PHYS/QHP 5/>YRS: CPT

## 2019-06-14 PROCEDURE — 25000128 H RX IP 250 OP 636: Performed by: UROLOGY

## 2019-06-14 PROCEDURE — 87040 BLOOD CULTURE FOR BACTERIA: CPT | Performed by: COLON & RECTAL SURGERY

## 2019-06-14 PROCEDURE — 87075 CULTR BACTERIA EXCEPT BLOOD: CPT | Performed by: PHYSICIAN ASSISTANT

## 2019-06-14 PROCEDURE — 25000128 H RX IP 250 OP 636: Performed by: STUDENT IN AN ORGANIZED HEALTH CARE EDUCATION/TRAINING PROGRAM

## 2019-06-14 PROCEDURE — 25000132 ZZH RX MED GY IP 250 OP 250 PS 637: Performed by: STUDENT IN AN ORGANIZED HEALTH CARE EDUCATION/TRAINING PROGRAM

## 2019-06-14 PROCEDURE — 36415 COLL VENOUS BLD VENIPUNCTURE: CPT | Performed by: COLON & RECTAL SURGERY

## 2019-06-14 PROCEDURE — 85027 COMPLETE CBC AUTOMATED: CPT | Performed by: PHYSICIAN ASSISTANT

## 2019-06-14 PROCEDURE — 25000125 ZZHC RX 250: Performed by: STUDENT IN AN ORGANIZED HEALTH CARE EDUCATION/TRAINING PROGRAM

## 2019-06-14 PROCEDURE — 25800030 ZZH RX IP 258 OP 636: Performed by: UROLOGY

## 2019-06-14 PROCEDURE — 12000001 ZZH R&B MED SURG/OB UMMC

## 2019-06-14 PROCEDURE — 82565 ASSAY OF CREATININE: CPT | Performed by: UROLOGY

## 2019-06-14 PROCEDURE — 80048 BASIC METABOLIC PNL TOTAL CA: CPT | Performed by: PHYSICIAN ASSISTANT

## 2019-06-14 PROCEDURE — 40000275 ZZH STATISTIC RCP TIME EA 10 MIN

## 2019-06-14 PROCEDURE — 99153 MOD SED SAME PHYS/QHP EA: CPT

## 2019-06-14 PROCEDURE — 0V953ZX DRAINAGE OF SCROTUM, PERCUTANEOUS APPROACH, DIAGNOSTIC: ICD-10-PCS | Performed by: RADIOLOGY

## 2019-06-14 PROCEDURE — 25000132 ZZH RX MED GY IP 250 OP 250 PS 637: Performed by: UROLOGY

## 2019-06-14 PROCEDURE — 80202 ASSAY OF VANCOMYCIN: CPT | Performed by: UROLOGY

## 2019-06-14 PROCEDURE — 87070 CULTURE OTHR SPECIMN AEROBIC: CPT | Performed by: PHYSICIAN ASSISTANT

## 2019-06-14 PROCEDURE — 85027 COMPLETE CBC AUTOMATED: CPT | Performed by: UROLOGY

## 2019-06-14 RX ORDER — FLUMAZENIL 0.1 MG/ML
0.2 INJECTION, SOLUTION INTRAVENOUS
Status: DISCONTINUED | OUTPATIENT
Start: 2019-06-14 | End: 2019-06-14 | Stop reason: HOSPADM

## 2019-06-14 RX ORDER — PIPERACILLIN SODIUM, TAZOBACTAM SODIUM 4; .5 G/20ML; G/20ML
4.5 INJECTION, POWDER, LYOPHILIZED, FOR SOLUTION INTRAVENOUS EVERY 6 HOURS
Status: DISCONTINUED | OUTPATIENT
Start: 2019-06-14 | End: 2019-06-15 | Stop reason: HOSPADM

## 2019-06-14 RX ORDER — NALOXONE HYDROCHLORIDE 0.4 MG/ML
.1-.4 INJECTION, SOLUTION INTRAMUSCULAR; INTRAVENOUS; SUBCUTANEOUS
Status: DISCONTINUED | OUTPATIENT
Start: 2019-06-14 | End: 2019-06-14 | Stop reason: HOSPADM

## 2019-06-14 RX ORDER — FENTANYL CITRATE 50 UG/ML
25-50 INJECTION, SOLUTION INTRAMUSCULAR; INTRAVENOUS EVERY 5 MIN PRN
Status: DISCONTINUED | OUTPATIENT
Start: 2019-06-14 | End: 2019-06-14 | Stop reason: HOSPADM

## 2019-06-14 RX ADMIN — VANCOMYCIN HYDROCHLORIDE 2000 MG: 10 INJECTION, POWDER, LYOPHILIZED, FOR SOLUTION INTRAVENOUS at 15:01

## 2019-06-14 RX ADMIN — ACETAMINOPHEN 650 MG: 325 TABLET, FILM COATED ORAL at 01:58

## 2019-06-14 RX ADMIN — LIDOCAINE HYDROCHLORIDE 3 ML: 10 INJECTION, SOLUTION EPIDURAL; INFILTRATION; INTRACAUDAL; PERINEURAL at 10:38

## 2019-06-14 RX ADMIN — FENTANYL CITRATE 100 MCG: 50 INJECTION INTRAMUSCULAR; INTRAVENOUS at 10:40

## 2019-06-14 RX ADMIN — PIPERACILLIN SODIUM,TAZOBACTAM SODIUM 3.38 G: 3; .375 INJECTION, POWDER, FOR SOLUTION INTRAVENOUS at 05:13

## 2019-06-14 RX ADMIN — OXYCODONE HYDROCHLORIDE 10 MG: 5 TABLET ORAL at 20:08

## 2019-06-14 RX ADMIN — OXYCODONE HYDROCHLORIDE 10 MG: 5 TABLET ORAL at 11:42

## 2019-06-14 RX ADMIN — ACETAMINOPHEN 650 MG: 325 TABLET, FILM COATED ORAL at 15:01

## 2019-06-14 RX ADMIN — ACETAMINOPHEN 650 MG: 325 TABLET, FILM COATED ORAL at 05:14

## 2019-06-14 RX ADMIN — SODIUM CHLORIDE: 9 INJECTION, SOLUTION INTRAVENOUS at 21:06

## 2019-06-14 RX ADMIN — ACETAMINOPHEN 650 MG: 325 TABLET, FILM COATED ORAL at 11:27

## 2019-06-14 RX ADMIN — LISINOPRIL 10 MG: 10 TABLET ORAL at 08:03

## 2019-06-14 RX ADMIN — MIDAZOLAM 2 MG: 1 INJECTION INTRAMUSCULAR; INTRAVENOUS at 10:39

## 2019-06-14 RX ADMIN — VANCOMYCIN HYDROCHLORIDE 2000 MG: 10 INJECTION, POWDER, LYOPHILIZED, FOR SOLUTION INTRAVENOUS at 02:00

## 2019-06-14 RX ADMIN — PIPERACILLIN SODIUM AND TAZOBACTAM SODIUM 4.5 G: 4; .5 INJECTION, POWDER, LYOPHILIZED, FOR SOLUTION INTRAVENOUS at 18:44

## 2019-06-14 RX ADMIN — OXYCODONE HYDROCHLORIDE 10 MG: 5 TABLET ORAL at 15:55

## 2019-06-14 RX ADMIN — PIPERACILLIN SODIUM AND TAZOBACTAM SODIUM 4.5 G: 4; .5 INJECTION, POWDER, LYOPHILIZED, FOR SOLUTION INTRAVENOUS at 11:24

## 2019-06-14 RX ADMIN — OXYCODONE HYDROCHLORIDE 10 MG: 5 TABLET ORAL at 01:58

## 2019-06-14 RX ADMIN — NICOTINE 1 PATCH: 7 PATCH TRANSDERMAL at 16:55

## 2019-06-14 RX ADMIN — OXYCODONE HYDROCHLORIDE 10 MG: 5 TABLET ORAL at 05:14

## 2019-06-14 RX ADMIN — ACETAMINOPHEN 650 MG: 325 TABLET, FILM COATED ORAL at 20:08

## 2019-06-14 ASSESSMENT — ACTIVITIES OF DAILY LIVING (ADL)
ADLS_ACUITY_SCORE: 13
ADLS_ACUITY_SCORE: 11

## 2019-06-14 ASSESSMENT — PAIN DESCRIPTION - DESCRIPTORS: DESCRIPTORS: ACHING

## 2019-06-14 NOTE — PLAN OF CARE
Ambulatory without SOB, on cpap at hs, +exp wheeze. VSS. +BS, kept on NPO - IR today for Aspiration of fluid collection in left scrotum. ; scrotum still swollen, reddened, and c/o pain- Oxycodone 10 mg and scheduled Tylenol given. No difficulty voiding. NS at 100 ml/hr via right piv. On Vanco and Zosyn. Showered at 0600. Continue poc.

## 2019-06-14 NOTE — BRIEF OP NOTE
Interventional Radiology Brief Post Procedure Note    Procedure: IR FINE NEEDLE ASPIRATION W ULTRASOUND    Proceduralist: Antoni Marlow MD    Attending: Gustavo Samuel MD    Time Out: Prior to the start of the procedure and with procedural staff participation, I verbally confirmed the patient s identity using two indicators, relevant allergies, that the procedure was appropriate and matched the consent or emergent situation, and that the correct equipment/implants were available. Immediately prior to starting the procedure I conducted the Time Out with the procedural staff and re-confirmed the patient s name, procedure, and site/side. (The Joint Commission universal protocol was followed.)  Yes    Medications   Medication Event Details Admin User Admin Time   lidocaine 1 % 1-30 mL Medication Given by Other Dose: 3 mL; Route: Intradermal; Comment: in IR for scrotal fluid aspirate by Sonia Beckett MD RN 6/14/2019 10:38 AM   midazolam (VERSED) injection 0.5-2 mg Medication Given Dose: 2 mg; Route: Intravenous; Comment: total given in IR Sonia Laura RN 6/14/2019 10:39 AM       Sedation: IR Nurse Monitored Care   Post Procedure Summary:  Prior to the start of the procedure and with procedural staff participation, I verbally confirmed the patient s identity using two indicators, relevant allergies, that the procedure was appropriate and matched the consent or emergent situation, and that the correct equipment/implants were available. Immediately prior to starting the procedure I conducted the Time Out with the procedural staff and re-confirmed the patient s name, procedure, and site/side. (The Joint Commission universal protocol was followed.)  Yes       Sedatives: Fentanyl and Midazolam (Versed)    Vital signs, airway and pulse oximetry were monitored and remained stable throughout the procedure and sedation was maintained until the procedure was complete.  The patient was monitored by staff until  sedation discharge criteria were met.    Patient tolerance: Patient tolerated the procedure well with no immediate complications.    Time of sedation in minutes: 25 minutes from beginning to end of physician one to one monitoring.          Findings:  Complex left superior scrotal fluid, 18 ml turbid serosanguinous fluid aspirated and sent for laboratory analysis/culture    Estimated Blood Loss: None    Fluoroscopy Time:  None    SPECIMENS: Fluid and/or tissue for laboratory analysis and culture    Complications: 1. None     Condition: Stable    Plan: 1 hr of post sedation observation. 18 ml fluid sent to lab/culture.    Comments: See dictated procedure note for full details.    Antoni Marlow MD

## 2019-06-14 NOTE — PHARMACY-VANCOMYCIN DOSING SERVICE
Pharmacy Vancomycin Note  Date of Service 2019  Patient's  1960   58 year old, male    Indication: Skin and Soft Tissue Infection, scrotal cellulitis/abscess   Goal Trough Level: 15-20 mg/L (increased due to abscess)  Day of Therapy: 3  Current Vancomycin regimen: 2000 mg IV q12h    Current estimated CrCl = Estimated Creatinine Clearance: 114.9 mL/min (based on SCr of 1.08 mg/dL).  Actual body Weight = 149.5 kg  Adjusted ideal body weight: 106.4 kg (234 lb 8.1 oz)     Creatinine for last 3 days  2019:  4:11 PM Creatinine 1.00 mg/dL  2019:  6:46 AM Creatinine 1.00 mg/dL  2019:  5:48 AM Creatinine 1.08 mg/dL;  5:48 AM Creatinine 1.08 mg/dL    Recent Vancomycin Levels (past 3 days)  2019:  1:59 PM Vancomycin Level 11.6 mg/L - 12-hour trough    Vancomycin IV Administrations (past 72 hours)                   vancomycin (VANCOCIN) 2,000 mg in sodium chloride 0.9 % 500 mL intermittent infusion (mg) 2,000 mg New Bag 19 1501     2,000 mg New Bag  0200     2,000 mg New Bag 19 1400     2,000 mg New Bag  0312     2,000 mg New Bag 19 1638                Nephrotoxins and other renal medications (From now, onward)    Start     Dose/Rate Route Frequency Ordered Stop    19 1100  piperacillin-tazobactam (ZOSYN) 4.5 g vial to attach to  mL bag      4.5 g  over 30 Minutes Intravenous EVERY 6 HOURS 19 0922      19 0800  lisinopril (PRINIVIL/ZESTRIL) tablet 10 mg      10 mg Oral DAILY 19 1439      19 1500  vancomycin (VANCOCIN) 2,000 mg in sodium chloride 0.9 % 500 mL intermittent infusion      2,000 mg  over 2 Hours Intravenous EVERY 12 HOURS 19 1456               Contrast Orders - past 72 hours (72h ago, onward)    Start     Dose/Rate Route Frequency Ordered Stop    19 1815  iopamidol (ISOVUE-370) solution 135 mL  Status:  Discontinued      135 mL Intravenous ONCE 19 1801 19 1927    19 181  iopamidol (ISOVUE-370)  solution 135 mL      135 mL Intravenous ONCE 06/12/19 1803 06/12/19 1924          Interpretation of levels and current regimen:  Trough level is Subtherapeutic    Has serum creatinine changed > 50% in last 72 hours: No    Urine output:  unable to determine    Renal Function: Stable    Plan:  1.  Increase Dose to 2500 mg (~16.7 mg/kg actual body weight, ~23.5 mg/kg adjusted body weight)  IV every 12 hours.  2.  Pharmacy will check trough levels as appropriate in 1-3 Days.    3. Serum creatinine levels will be ordered daily for the first week of therapy and at least twice weekly for subsequent weeks.      Kathia Jackson, PharmD  6/14/2019 3:41 PM

## 2019-06-14 NOTE — PLAN OF CARE
/51   Pulse 76   Temp 96.6  F (35.9  C) (Oral)   Resp 18   SpO2 94%     Neuro: A&Ox4.   Cardiac: SR. VSS.   Respiratory: Sating 94% on RA.  GI/: No BM. Adequate urine output.   Diet/appetite: Tolerating regular diet. Eating well.  Activity:  Independent up to chair, bathroom and in halls.  Pain: At acceptable level on current regimen.   Skin: No new deficits noted.  LDA's:PIV infusing.  New this shift : Pt went to IR for aspiration of  scrotum fluid and sample sent to lab.  Plan: Continue with POC. Notify primary team with changes.

## 2019-06-14 NOTE — PRE-PROCEDURE
Interventional Radiology Pre-Procedure Sedation Assessment   Time of Assessment: 10:17 AM    Expected Level: Moderate Sedation    Indication: Sedation is required for the following type of Procedure: left scrotal abscess aspiration    Sedation and procedural consent: Risks, benefits and alternatives were discussed with Patient    PO Intake: Appropriately NPO for procedure    ASA Class: Class 3 - SEVERE SYSTEMIC DISEASE, DEFINITE FUNCTIONAL LIMITATIONS.    Mallampati: Grade 1:  Soft palate, uvula, tonsillar pillars, and posterior pharyngeal wall visible    Lungs: Lungs Clear with good breath sounds bilaterally    Heart: Normal heart sounds and rate    History and physical reviewed and no updates needed. I have reviewed the lab findings, diagnostic data, medications, and the plan for sedation. I have determined this patient to be an appropriate candidate for the planned sedation and procedure and have reassessed the patient IMMEDIATELY PRIOR to sedation and procedure.    Antoni Marlow MD

## 2019-06-14 NOTE — PROGRESS NOTES
Urology Daily Progress Note    24 hour events/Subjective:     - No acute events overnight, afebrile   - Afebrile   - NPO for IR aspiration today    O:  Vitals: /68 (BP Location: Right arm)   Pulse 77   Temp 97.2  F (36.2  C) (Oral)   Resp 20   SpO2 95%    - Temp (24hrs), Av.4  F (36.3  C), Min:96.6  F (35.9  C), Max:97.9  F (36.6  C)      General: Alert, interactive, in NAD  Resp: Non-labored breathing on RA  Abdomen: Soft, nontender, nondistended  Ext: Warm and well perfused  : Diffusely enlarged, edematous, erythematous scrtoum without crepitus, tender on palpation.    I/O  I/O last 3 completed shifts:  In: 3861.66 [P.O.:700; I.V.:3161.66]  Out: -  - Last 24 hours    No intake/output data recorded. - Since Midnight    Labs  Pending this am    Heme:  Recent Labs   Lab 19  2259 19  0646 19  1611   WBC 11.8* 11.3* 13.9*   HGB 13.3 13.8 14.4    349 377     Chem:  Recent Labs   Lab 19  0646 19  1611   POTASSIUM 4.3 4.2   CR 1.00 1.00     INR  Recent Labs   Lab 19  2259   INR 0.96        Assessment/Plan  58 year old y/o male admitted with scrotal cellulitis/abscess s/p spermatocelectomy/hydrocelectomy on 19 with plan for aspiration today.    NEURO Pain well controlled on  APAP with PRN Oxycodone and IV Dilaudid.  Changes:  None    CV HDS.    PULM Aggressive pulmonary toilet and I/S.   FEN/GI IVF: 100cc/hr while NPO  Diet: Keep NPO for aspiration today under sedation    Keep scrotum elevated.     HEME Hgb as above.    ID Afebrile,labs pending   Antibiotics: continue vanc and zosyn per ID   ENDO No issues   ACTIVITY Up as tolerated    PPx SCDs, ambulation   DISPO 7B, dispo pending aspiration/ clinical improvement         Seen and examined with chief resident, to be discussed with Dr. Mark    --  QING Orlando MD  Urology Resident

## 2019-06-14 NOTE — PROGRESS NOTES
Patient Name: Dima Albright  Medical Record Number: [id number]  Today's Date: 6/14/2019    Procedure: Ultrasound Guided Scrotal Abscess Aspirate  Proceduralist: LUKE Marlow MD with REGINALDO Samuel MD    Sedation start time: 1011  Sedation end time: 1035  Sedation medications administered: versed 2 mg; fentanyl 100 mcg  Total sedation time: 25 minutes  Sedation Notes: VSS, comfortable    Procedure start time: 1025  Puncture time: 1025  Procedure end time: 1035    Report given to:  RNShawday  : none    Other Notes: Pt arrived to IR room 6 from . Consent obtained. Pt denies any questions or concerns regarding procedure. Pt positioned supine and monitored per protocol. Pt tolerated procedure without any noted complications. Specimens sent to lab for culture/gramstain/anaerobic culture. Pt transferred back to .

## 2019-06-14 NOTE — PLAN OF CARE
Temp: 97.4  F (36.3  C) Temp src: Oral BP: 119/55 Pulse: 78   Resp: 16 SpO2: 94 % O2 Device: None (Room air)       Status:admitted for scrotal edema, redness  Neuro: alert, oriented, pleasant  GI/:  voiding ind  Incisions/Drains/Skin: scrotum swollen, red, old incision  IV Access: PIV with NS at 100, intermittent antibx  Labs: WBC 11.3, positive BC  Pain: prn oxycodone and dilaudid for breakthrough, scheduled tylenol, gave pt towels to elevate scrotum  Diet: regular, good appetite. NPO at midnight  Activity: up ad carol  New changes this shift: none, CPAP at hs   Plan:  continue antibx therapy, IR tomorrow

## 2019-06-15 VITALS
TEMPERATURE: 97 F | DIASTOLIC BLOOD PRESSURE: 62 MMHG | RESPIRATION RATE: 16 BRPM | SYSTOLIC BLOOD PRESSURE: 123 MMHG | HEART RATE: 76 BPM | OXYGEN SATURATION: 93 %

## 2019-06-15 LAB
ANION GAP SERPL CALCULATED.3IONS-SCNC: 4 MMOL/L (ref 3–14)
BACTERIA SPEC CULT: ABNORMAL
BUN SERPL-MCNC: 16 MG/DL (ref 7–30)
CALCIUM SERPL-MCNC: 8.4 MG/DL (ref 8.5–10.1)
CHLORIDE SERPL-SCNC: 109 MMOL/L (ref 94–109)
CO2 SERPL-SCNC: 25 MMOL/L (ref 20–32)
CREAT SERPL-MCNC: 1.05 MG/DL (ref 0.66–1.25)
ERYTHROCYTE [DISTWIDTH] IN BLOOD BY AUTOMATED COUNT: 13.4 % (ref 10–15)
GFR SERPL CREATININE-BSD FRML MDRD: 79 ML/MIN/{1.73_M2}
GLUCOSE SERPL-MCNC: 94 MG/DL (ref 70–99)
HCT VFR BLD AUTO: 42 % (ref 40–53)
HGB BLD-MCNC: 12.6 G/DL (ref 13.3–17.7)
Lab: ABNORMAL
MCH RBC QN AUTO: 29.8 PG (ref 26.5–33)
MCHC RBC AUTO-ENTMCNC: 30 G/DL (ref 31.5–36.5)
MCV RBC AUTO: 99 FL (ref 78–100)
PLATELET # BLD AUTO: 355 10E9/L (ref 150–450)
POTASSIUM SERPL-SCNC: 4.1 MMOL/L (ref 3.4–5.3)
RBC # BLD AUTO: 4.23 10E12/L (ref 4.4–5.9)
SODIUM SERPL-SCNC: 138 MMOL/L (ref 133–144)
SPECIMEN SOURCE: ABNORMAL
WBC # BLD AUTO: 10.8 10E9/L (ref 4–11)

## 2019-06-15 PROCEDURE — 25000128 H RX IP 250 OP 636: Performed by: UROLOGY

## 2019-06-15 PROCEDURE — 25800030 ZZH RX IP 258 OP 636: Performed by: UROLOGY

## 2019-06-15 PROCEDURE — 25000132 ZZH RX MED GY IP 250 OP 250 PS 637: Performed by: UROLOGY

## 2019-06-15 PROCEDURE — 25000128 H RX IP 250 OP 636: Performed by: PHYSICIAN ASSISTANT

## 2019-06-15 PROCEDURE — 85027 COMPLETE CBC AUTOMATED: CPT | Performed by: PHYSICIAN ASSISTANT

## 2019-06-15 PROCEDURE — 80048 BASIC METABOLIC PNL TOTAL CA: CPT | Performed by: PHYSICIAN ASSISTANT

## 2019-06-15 PROCEDURE — 36415 COLL VENOUS BLD VENIPUNCTURE: CPT | Performed by: PHYSICIAN ASSISTANT

## 2019-06-15 RX ORDER — OXYCODONE HYDROCHLORIDE 5 MG/1
5-10 TABLET ORAL EVERY 6 HOURS PRN
Qty: 25 TABLET | Refills: 0 | Status: SHIPPED | OUTPATIENT
Start: 2019-06-15 | End: 2019-07-15

## 2019-06-15 RX ORDER — SULFAMETHOXAZOLE/TRIMETHOPRIM 800-160 MG
2 TABLET ORAL 2 TIMES DAILY
Qty: 56 TABLET | Refills: 0 | Status: SHIPPED | OUTPATIENT
Start: 2019-06-15 | End: 2019-07-15

## 2019-06-15 RX ORDER — ACETAMINOPHEN 325 MG/1
650 TABLET ORAL EVERY 4 HOURS PRN
Qty: 50 TABLET | Refills: 0 | Status: SHIPPED | OUTPATIENT
Start: 2019-06-15 | End: 2019-07-15

## 2019-06-15 RX ADMIN — ACETAMINOPHEN 650 MG: 325 TABLET, FILM COATED ORAL at 09:06

## 2019-06-15 RX ADMIN — Medication 0.2 MG: at 13:12

## 2019-06-15 RX ADMIN — VANCOMYCIN HYDROCHLORIDE 2500 MG: 1 INJECTION, POWDER, LYOPHILIZED, FOR SOLUTION INTRAVENOUS at 02:58

## 2019-06-15 RX ADMIN — LISINOPRIL 10 MG: 10 TABLET ORAL at 09:06

## 2019-06-15 RX ADMIN — OXYCODONE HYDROCHLORIDE 10 MG: 5 TABLET ORAL at 11:32

## 2019-06-15 RX ADMIN — ACETAMINOPHEN 650 MG: 325 TABLET, FILM COATED ORAL at 11:32

## 2019-06-15 RX ADMIN — PIPERACILLIN SODIUM AND TAZOBACTAM SODIUM 4.5 G: 4; .5 INJECTION, POWDER, LYOPHILIZED, FOR SOLUTION INTRAVENOUS at 12:57

## 2019-06-15 RX ADMIN — PIPERACILLIN SODIUM AND TAZOBACTAM SODIUM 4.5 G: 4; .5 INJECTION, POWDER, LYOPHILIZED, FOR SOLUTION INTRAVENOUS at 06:43

## 2019-06-15 RX ADMIN — PIPERACILLIN SODIUM AND TAZOBACTAM SODIUM 4.5 G: 4; .5 INJECTION, POWDER, LYOPHILIZED, FOR SOLUTION INTRAVENOUS at 00:59

## 2019-06-15 RX ADMIN — OXYCODONE HYDROCHLORIDE 10 MG: 5 TABLET ORAL at 00:02

## 2019-06-15 RX ADMIN — ACETAMINOPHEN 650 MG: 325 TABLET, FILM COATED ORAL at 00:02

## 2019-06-15 ASSESSMENT — ACTIVITIES OF DAILY LIVING (ADL)
ADLS_ACUITY_SCORE: 11
ADLS_ACUITY_SCORE: 11
ADLS_ACUITY_SCORE: 13
ADLS_ACUITY_SCORE: 11

## 2019-06-15 NOTE — DISCHARGE SUMMARY
Discharge Summary     Dima Albrgiht MRN# [id number]   YOB: 1960 Age: 58 year old     Date of Admission:  6/12/2019  Date of Discharge::  6/15/2019  Admitting Physician:  Jovanni Mark MD  Discharge Physician:  Jonnathan Orlando MD  Primary Care Physician:         No Ref-Primary, Physician          Admission Diagnoses:   Scrotal cellulitis  Cellulitis  Cellulitis  Cellulitis of scrotum          Discharge Diagnosis:   Same as above         Procedures:   None        Non-operative procedures:   Interventional radiology aspiration           Consultations:   PHARMACY TO DOSE GENTAMICIN  PHARMACY TO DOSE VANCO  VASCULAR ACCESS CARE ADULT IP CONSULT  INFECTIOUS DISEASE GENERAL ADULT IP CONSULT  INTERVENTIONAL RADIOLOGY ADULT/PEDS IP CONSULT           Imaging Studies:     Results for orders placed or performed during the hospital encounter of 06/12/19   CT Pelvis Soft Tissue w Contrast     Value    Radiologist flags Concern for developing abscess and concern for (Urgent)    Narrative    EXAMINATION: CT PELVIS SOFT TISSUE W CONTRAST, 6/12/2019 7:56 PM    TECHNIQUE:  Helical CT images from the iliac crests through the  symphysis pubis were obtained with IV contrast. Contrast dose:  iopamidol (ISOVUE-370) solution 135 mL     COMPARISON: Ultrasound 6/10/2019, 3/19/2019    HISTORY: Scrotal mass or lump; Patient with worsening scrotal pain,  swelling and erythema s/p spermatocele repair - PLEASE INCLUDE ENTIRE  SCROTUM in images    FINDINGS:    Pelvis:    Substantial soft tissue swelling and edema within the scrotum,  particularly involving the left aspect of the scrotum. There is a  rim-enhancing complex fluid collection within the left aspect of the  scrotum measuring approximately 4.2 x 7.5 x 5.9 cm. No air is seen  within the scrotum. The testicles are unremarkable in appearance.  Small right and moderate left hydrocele. Unchanged swirled appearance  of the blood vessels and fat  extending into the scrotum on the left.    The visualized bowel in the lower abdomen is unremarkable. The  appendix is identified in the right lower quadrant and is unremarkable  in appearance. No free air. No free fluid. No pneumatosis in the  partially visualized loops of bowel. Symmetric seminal vesicles.  Pelvic phleboliths. The urinary bladder is distended and otherwise  unremarkable in appearance. Dense central prostatic calcifications.  The prostate measures up to 5.3 cm diameter. No fluid within the  pelvis or visualized abdomen. No free air.    Bones and soft tissues: No acute osseous abnormality. Mild  degenerative changes of the lumbosacral junction, with disc space  narrowing and intradiscal gas. Degenerative changes of bilateral SI  joints.      Impression    IMPRESSION:   1. Complex rim-enhancing fluid collection within the left scrotum,  concerning for developing abscess, less likely hematoma. There is  substantial associated scrotal edema, without evidence of scrotal air.  Consider further evaluation with new scrotal ultrasound if clinically  indicated. No definite abscess is seen on prior ultrasound.  2. Moderate left and small right hydrocele.  3. Unchanged swirled appearance of the internal contents of the left  scrotal sac, cannot completely rule out torsion which was not present  on ultrasound 6/10/2008.    [Urgent Result: Concern for developing abscess and concern for  testicular torsion]    Finding was identified on 6/12/2019 8:20 PM.     Dr. Orlando was contacted by Dr. Arriaga at 6/12/2019 8:46 PM and  verbalized understanding of the urgent finding.     I have personally reviewed the examination and initial interpretation  and I agree with the findings.    JOE ARRIAGA MD   IR Fine Needle Aspiration w Imaging    Narrative    PROCEDURES 6/14/2019 10:46 AM:  1. Ultrasound guided left scrotal aspiration    Clinical History: Aspiration fluid collection within the left  scrotum.      Comparisons: CT 6/12/2019    Staff: Padmini Samuel MD    Fellow/Resident: Antoni Marlow MD    I, PADMINI SAMUEL MD, attest that I was present for all critical portions  of the procedure and was immediately available to provide guidance and  assistance during the remainder of the procedure.    Monitoring: Patient was placed on continuous monitoring with  intravenous conscious sedation administered by the IR nursing staff  and supervised by the IR attending. Patient remained stable throughout  the procedure.     Medications:  1. Versed IV: 2.0 mg  2. Fentanyl IV: 100 mcg  3. 7 cc 1% lidocaine    Sedation time: 25 minutes    Attending face-to-face sedation time: Less than 5 minutes.    PROCEDURE: The patient understood the limitations, alternatives, and  risks of the procedure and requested the procedure be performed. Both  written and oral consent were obtained.    Limited preprocedure scan demonstrated complex left superior scrotal  fluid collection.    Left scrotum was prepped and draped in the usual sterile fashion. 1%  lidocaine was used for local anesthesia. Under ultrasound guidance, a  20-gauge coaxial needle was advanced into the into the superior left  scrotal fluid collection. Ultrasound image documenting placement was  saved in the patient's record.    The inner stylet of the needle was removed.  18 cc turbid  serosanguineous fluid aspirated. Needle removed. Sterile dressing  applied. The patient tolerated the procedure well. No immediate  complication.    Estimate blood loss: Minimal    Specimens: Approximately 18 cc turbid serosanguineous fluid.       Impression    IMPRESSION:  Successful aspiration of the complex left scrotal fluid  collection, 18 cc turbid brown serosanguineous fluid was sent for  laboratory analysis/culture.    I have personally reviewed the examination and initial interpretation  and I agree with the findings.    PADMINI SAMUEL MD            Medications Prior to Admission:      Medications Prior to Admission   Medication Sig Dispense Refill Last Dose     lisinopril (PRINIVIL/ZESTRIL) 10 MG tablet Take 1 tablet (10 mg) by mouth daily 90 tablet 3 Taking     [DISCONTINUED] amoxicillin-clavulanate (AUGMENTIN) 875-125 MG tablet Take 1 tablet by mouth 2 times daily 20 tablet 0      [DISCONTINUED] oxyCODONE (ROXICODONE) 5 MG tablet Take 1-2 tablets (5-10 mg) by mouth every 4 hours as needed for moderate to severe pain (Patient not taking: Reported on 6/10/2019) 20 tablet 0 Not Taking     [DISCONTINUED] oxyCODONE-acetaminophen (PERCOCET) 5-325 MG tablet Take 1 tablet by mouth every 6 hours as needed for severe pain 20 tablet 0             Discharge Medications:     Current Discharge Medication List      START taking these medications    Details   acetaminophen (TYLENOL) 325 MG tablet Take 2 tablets (650 mg) by mouth every 4 hours as needed for mild pain  Qty: 50 tablet, Refills: 0    Associated Diagnoses: Cellulitis of scrotum      sulfamethoxazole-trimethoprim (BACTRIM DS/SEPTRA DS) 800-160 MG tablet Take 2 tablets by mouth 2 times daily for 14 days  Qty: 56 tablet, Refills: 0    Associated Diagnoses: Cellulitis of scrotum         CONTINUE these medications which have CHANGED    Details   amoxicillin-clavulanate (AUGMENTIN) 875-125 MG tablet Take 1 tablet by mouth 2 times daily for 14 days  Qty: 28 tablet, Refills: 0    Associated Diagnoses: Cellulitis of scrotum      oxyCODONE (ROXICODONE) 5 MG tablet Take 1-2 tablets (5-10 mg) by mouth every 6 hours as needed for severe pain  Qty: 25 tablet, Refills: 0    Associated Diagnoses: Cellulitis of scrotum         CONTINUE these medications which have NOT CHANGED    Details   lisinopril (PRINIVIL/ZESTRIL) 10 MG tablet Take 1 tablet (10 mg) by mouth daily  Qty: 90 tablet, Refills: 3    Associated Diagnoses: Mild hypertension         STOP taking these medications       oxyCODONE-acetaminophen (PERCOCET) 5-325 MG tablet Comments:   Reason for  Stopping:                      Brief History of Illness:   Reason for admission requiring a surgical or invasive procedure:   * No surgery found *   The patient underwent the following procedure(s):   See above   There were no immediate complications during this procedure.    Please refer to the full operative summary for details.           Hospital Course:   The patient's hospital course was unremarkable.  He was started on broad spectrum antibiotics with good clinical response and underwent IR guided percutaneous drainage of a left sided scrotal fluid collection.    On hospital day 3 patient was afebrile with resolved leukocytosis and stable findings on exam.  He was ambulating without assitance, tolerating the discharge diet, had pain controlled with PO medications. Patient was discharged home with appropriate contact information, follow-up and instructions as seen below in the discharge paperwork.       Final Pathology Result:   Pending at time of discharge         Discharge Instructions and Follow-Up:     Discharge Procedure Orders   Reason for your hospital stay   Order Comments: Antibiotics and drainage of scrotal fluid collection     Follow Up and recommended labs and tests   Order Comments: Follow up with Dr Mark in clinic on Wednesday or Thursday this week     Activity   Order Comments: See discharge instructions     Order Specific Question Answer Comments   Is discharge order? Yes      Monitor and record   Order Comments: See discharge instructions     When to contact your care team   Order Comments: See discharge instructions     Wound care and dressings   Order Comments: See discharge instructions     Discharge Instructions   Order Comments: Activity  - No strenuous exercise or lifting, pushing, pulling more than 10 pounds until directed to do so by Dr Mark at follow up    Wound / Scrotal Care  - Resume daily showers allowing water to wash over the scrotum and incision  - No baths for 4 weeks after  "initial surgery  - In order to reduce scrotal swelling wear snug underwear and elevate the scrotum while in bed    Urination  - If you are unable to urinate you should return urgently to the ED or call clinic to try to arrange for an urgent visit same day.     Medications  1) Home Medications  -Resume home medications  2) Pain  - Continue previously prescribed narcotic medications for pain.  Narcotics may cause constipation - continue stool softener while on narcotics.  3) Antibiotics  -You will be discharged to home with a 2 week course of antibiotics (augmentin and bactrim).  We will follow up on your final culture results and change your antibiotic regimen as needed    Follow-Up:  - Call your primary care provider to touch base regarding your recent admission.    - You will be scheduled for follow up with Dr Mark this week.  Call the number below on Monday to confirm.  - Call or return sooner than your regularly scheduled visit if you develop any of the following: fever (greater than 101.5), uncontrolled pain, uncontrolled nausea or vomiting, as well as increased redness, swelling, or drainage from your wound.     Phone numbers:   - Monday through Friday 8am to 4:30pm: Call [phone] with questions or to schedule or confirm appointment.    - Nights or weekends: call the after hours emergency pager - [phone] and tell the  \"I would like to page the Urology Resident on call.\"  - For emergencies, call [phone]     Full Code     Order Specific Question Answer Comments   Code status determined by: Unable to discuss and no AD/POLST on file; continue PREVIOUSLY ORDERED code status      Diet   Order Comments: See discharge instructions     Order Specific Question Answer Comments   Is discharge order? Yes             Discharge Disposition:   Discharged to Home      Condition at discharge: Good    --    Jonnathan Orlando MD  Urology Resident    2:43 PM, 6/15/2019    "

## 2019-06-15 NOTE — PLAN OF CARE
All vitals stable.  AVS printed and reviewed w/ patient and sister, all questions answered.  All LDA's removed and documented.  All education documented and resolved.  PT to discharge to sister's home, in a car, with sister.

## 2019-06-15 NOTE — PROGRESS NOTES
[facility] ID Service: Follow Up Note      Patient:  Dima Albright, Date of birth 1960, Medical record number [id number]  Date of Visit:  June 14, 2019         Assessment and Recommendations:   ID Problem List:  - Soft tissue infection/abscess of scrotum status post left hydrocelectomy 6/4/19.  - Single blood cx with Staph hominis, presumed contaminant.     Recommendations:  - Continue current abx (vanco/zosyn).   - Trend CRP every few days.   - Will provide longer term abx recommendations based on abscess culture results.   - I reordered blood cx given positive result from yesterday (assume this is a contaminant).     Discussion: 57 yo male with obesity and HTN, who underwent left hydrocelectomy on 6/4. He developed progressive post operative pain, swelling and scrotal erythema which worsened on outpatient augmentin, prompting admission. CT demonstrated possible abscess vs post op fluid collection. He is improving on broad spectrum abx and underwent aspiration of the collection today. Gram stain had no organisms but hopefully cx will grow so that we can tailor abx.     Also, he was noted to have a single positive blood cx with Staph hominis. I presume that this is a contaminant, although he is on appropriate therapy. Cultures will be repeated today.     Recs were discussed with primary team today.  Dr. Barnett will be covering this weekend; please page him with questions ([phone]). I will resume care on Monday.      Attestation:  I have reviewed today's vital signs, medications, labs and imaging.  Елена Rodriguez MD  Pager [phone]          Interval History:   Tolerated aspiration well. Feeling about the same - happy that scrotal pain is not worsening. Denies any other symptoms.            Review of Systems:   Full 9 pt ROS obtained, pertinent positives and negatives as above.          Current Antimicrobials   Current:  Vanco, start 6/12  Zosyn, start 6/12    Prior:  Augmentin 6/8-6/12          Physical Exam:   Ranges for vital signs:  Temp:  [96.6  F (35.9  C)-97.4  F (36.3  C)] 97.4  F (36.3  C)  Pulse:  [70-77] 76  Heart Rate:  [70-82] 82  Resp:  [16-20] 18  BP: (112-139)/(51-73) 124/64  SpO2:  [93 %-96 %] 93 %  GENERAL:  Obese,pleasant, in bed in no acute distress.   EYES:  Eyes have anicteric sclerae without conjunctival injection   NECK:  Supple.   LUNGS:  breathing comfortably on RA   ABDOMEN:  Nondistended  : Penis buried in swollen erthymematous /edematous scrotum. Left scrotum redder and feels firmer than right, slightly improved since yesterday. No drainage.   SKIN: PIV site healthy appearing.   NEUROLOGIC:  Grossly nonfocal. Active x4 extremities           Laboratory Data:   Reviewed.  Pertinent for:  WBC 11.2    Culture data:  Blood   612 (1/1) Staph hominis  6/14 (2/2) NGTD    Scrotal abscess  6/14: gram stain with WBCs, cultures pending         Imaging:   No new imaging today.

## 2019-06-15 NOTE — PLAN OF CARE
Pt VSS.Good pain control with tylenol and oxycodone.Scrotum red,warm and edematous.MD here to see.Blood cx ordered and drawn.Continue with IVAB.MIV running and pt drinking well and voiding a lot.Would like to have it SL if possible.Continue with POC.

## 2019-06-15 NOTE — PLAN OF CARE
Neuro: A0x4 able to make needs known. Slept for periods of time this shift  Cv: WNL, HTN controlled w/ lisinopril  Pulm: No c/o SOB, some expiratory wheezes, sats mid 90's on Ra. Current smoker, brian patch in place  GI/: Up to void ad carol, no BM - large edematous scrotum markedly red & enlarged  Diet: Regular  Incisions/Drains: scrotal inc from 6/4 spermatocelle repair  IV Access: R PIV NS 50/hr  Labs: WBC 10.8  Activity: Independent  Pain: Scrotum pain, Q4 5-10mg oxy + Q3 Dilaudid     New changes this shift: vanc & zosyn infused per order  Plan: proceed w/ POC

## 2019-06-15 NOTE — PLAN OF CARE
Temp: 97.3  F (36.3  C) Temp src: Oral BP: 116/63 Pulse: 76 Heart Rate: 67 Resp: 18 SpO2: 93 % O2 Device: BiPAP/CPAP     Status:admitted with scrotal cellulitis/abscess, s/p IR aspiration  Neuro: alert, oriented, pleasant  GI/:  voiding per urinal  Incisions/Drains/Skin: scrotum red, swollen with incision  IV Access: PIV with NS at 50, intermittent antibx  Pain: prn oxycodone and scheduled tylenol   Diet: regular, good appetite  Activity: up ad carol  New changes this shift: decrease in MIVF  Plan:  possible discharge pending clinical improvement

## 2019-06-15 NOTE — PROGRESS NOTES
Urology Daily Progress Note    24 hour events/Subjective:     - No acute events overnight, afebrile   - Pain well controlled      O:  Vitals: /63 (BP Location: Right arm)   Pulse 76   Temp 97.3  F (36.3  C) (Oral)   Resp 18   SpO2 93%    - Temp (24hrs), Av.4  F (36.3  C), Min:96.6  F (35.9  C), Max:97.9  F (36.6  C)      General: Alert, interactive, in NAD  Resp: Non-labored breathing on RA  Abdomen: Soft, nontender, nondistended  Ext: Warm and well perfused  : Diffusely enlarged, edematous, erythematous scrtoum without crepitus, tender on palpation.    I/O  I/O last 3 completed shifts:  In: 350 [P.O.:350]  Out: 1350 [Urine:1350] - Last 24 hours    No intake/output data recorded. - Since Midnight    Labs  Pending this am    Heme:  Recent Labs   Lab 06/15/19  0655 19  0548 19  2259 19  0646   WBC 10.8 11.2* 11.8* 11.3*   HGB 12.6* 13.6 13.3 13.8    330 364 349     Chem:  Recent Labs   Lab 06/15/19  0655 19  0548 19  0646 19  1611   POTASSIUM 4.1 4.8 4.3 4.2   CR 1.05 1.08  1.08 1.00 1.00     INR  Recent Labs   Lab 19  2259   INR 0.96        Assessment/Plan  58 year old y/o male admitted with scrotal cellulitis/abscess after undergoing recent spermatocelectomy/hydrocelectomy on 19.  Now s/p scrotal fluid collection aspiration.     NEURO     Pain well controlled on  APAP with PRN Oxycodone and IV Dilaudid.  Changes:  None   CV HDS.   PULM Aggressive pulmonary toilet and I/S.  FEN/GI IVF:  Regular diet    Physical exam reveals mildly improved cellulitis with no concerning development; Keep scrotum elevated to address edema   HEME Hgb as above.   ID Suspected scrotal abscess with overlying cellulitis - Afebrile with resolving leukocytosis.  Plan to continue vanc and zosyn per ID and follow up on scrotal and repeat blood cultures  ENDO No issues  ACTIVITY Up as tolerated   PPx SCDs, ambulation  DISPO 7B,      Seen and examined with chief resident and  Dr Núñez, to be discussed with Dr. Mark    --  QING Orlando MD  Urology Resident

## 2019-06-15 NOTE — PROGRESS NOTES
[facility] ID Service: Follow Up Note      Patient:  Dima Albright, Date of birth 1960, Medical record number [id number]  Date of Visit:  Anjelica 15, 2019         Assessment and Recommendations:     Problem list:  1. Soft tissue infection: scrotal cellulitis and abscess s/p aspiration 6/14/19  2. S/p left scrotal hydrocelectomy 6/4/19  3. Positive blood culture, S. Hominis in 1/2 from 6/12 - can assume contaminate     RECOMMENDATION:  1. Discontinue vancomycin and piperacillin-tazobactam  2. Start amoxicillin-clavulantate (875mg PO BID) and TMP-SMX (2 tabs PO BID)   - Plan to continue antibiotics for a total 14 day course.   - Planned end date 6/28/18  3. Continue to follow pending abscess cultures, may take few more days to finalize  4. OK to discharge prior to cultures finalizing given improvement, he will return if worsening  5. Plans to follow-up with urology next week. Does not need ID follow-up unless problems.     DISCUSSION  Dima Albright, 58 year old male, with significant PMH for hydrocele >10years, hypertension, obesity, MICHA on home CPAP who was admitted here on 06/12 with worsening swelling, pain and redness of his scrotum; 9 days following left scrotal hydrocelectomy. CT pelvis with complex rim-enhancing fluid collection within the left scrotum, concerning for developing abscess. Given recent instrumentation and site of infection, patient likely has polymicrobial soft tissue infection. He is currently appropriately covered with broad spectrum antibiotics-vancomycin for possible staph, and zoysn coverage of coliforms/anearobes/enteroccoci. Aspiration of abscess yesterday, and he is doing well clinically with improvement in cellulitis. It would be reasonable to transition to oral antibiotics, and we would recommend restarting amox-clav and adding TMP-SMX. If team feels he is ready to be discharged, this would be OK by us but cultures should be followed up on. He has an appointment scheduled  with urology in clinic early next week and it would be reasonable to follow up at that time. If concern for worsening cellulitis before then, he will return to hospital.     We will sign off from the case at this point, anticipating discharge home in next couple of days. Call anytime with questions, concerns, or changes in status.     Jovanni Barnett MD  Infectious Diseases  [phone]         Interval History:     Improvement in erythema and pain. Remains afebrile.     Antibiotics:  -IV vancomycin 06/12-  -IV zoysn 06/12-  -Augmentin 06/08-06/12     Recent culture results include:  6/14 Blood   NGTD   Scrotal abscess  NGTD  6/12 Blood   S. Hominis in 1/2 bottles (assume contaminate)         Review of Systems:   CONSTITUTIONAL:  No fevers or chills  EYES: negative for icterus  ENT:  negative for oral lesions, hearing loss, tinnitus and sore throat  RESPIRATORY:  negative for cough and dyspnea  CARDIOVASCULAR:  negative for chest pain, palpitations  GASTROINTESTINAL:  negative for nausea, vomiting, diarrhea and constipation  GENITOURINARY:  negative for dysuria, improved scrotal redness and pain  HEME:  No easy bruising/bleeding  INTEGUMENT:  negative for rash and pruritus  NEURO:  Negative for headache         Physical Exam:   Ranges for vital signs:  Temp:  [96.9  F (36.1  C)-97.5  F (36.4  C)] 97  F (36.1  C)  Heart Rate:  [67-82] 76  Resp:  [16-18] 16  BP: (116-139)/(57-77) 123/62  SpO2:  [93 %-94 %] 93 %    Intake/Output Summary (Last 24 hours) at 6/15/2019 1414  Last data filed at 6/15/2019 0000  Gross per 24 hour   Intake --   Output 1050 ml   Net -1050 ml     Exam:  GENERAL:  well-developed, well-nourished, sitting in bed in no acute distress.   ENT:  Head is normocephalic, atraumatic. Oropharynx is moist without exudates or ulcers.  EYES:  Eyes have anicteric sclerae.    NECK:  Supple.  LUNGS:  Clear to auscultation.  CARDIOVASCULAR:  Regular rate and rhythm with no murmurs, gallops or rubs.  ABDOMEN:  Normal  bowel sounds, soft, nontender.  EXT: Extremities warm and without edema.  SKIN:  No acute rashes.  Line is in place without any surrounding erythema.  NEUROLOGIC:  Grossly nonfocal.  Scrotum: Penis buried in swollen erthymematous /edematous scrotum improved from previous. Left scrotum redder and feels firmer than right. No appreciable inguinal lymphadenopathy.         Laboratory Data:     Creatinine   Date Value Ref Range Status   06/15/2019 1.05 0.66 - 1.25 mg/dL Final   06/14/2019 1.08 0.66 - 1.25 mg/dL Final   06/14/2019 1.08 0.66 - 1.25 mg/dL Final   06/13/2019 1.00 0.66 - 1.25 mg/dL Final   06/12/2019 1.00 0.66 - 1.25 mg/dL Final     WBC   Date Value Ref Range Status   06/15/2019 10.8 4.0 - 11.0 10e9/L Final   06/14/2019 11.2 (H) 4.0 - 11.0 10e9/L Final   06/13/2019 11.8 (H) 4.0 - 11.0 10e9/L Final   06/13/2019 11.3 (H) 4.0 - 11.0 10e9/L Final   06/12/2019 13.9 (H) 4.0 - 11.0 10e9/L Final     Hemoglobin   Date Value Ref Range Status   06/15/2019 12.6 (L) 13.3 - 17.7 g/dL Final     Platelet Count   Date Value Ref Range Status   06/15/2019 355 150 - 450 10e9/L Final     CRP Inflammation   Date Value Ref Range Status   06/13/2019 48.0 (H) 0.0 - 8.0 mg/L Final     AST   Date Value Ref Range Status   05/21/2019 20 0 - 45 U/L Final     ALT   Date Value Ref Range Status   05/21/2019 29 0 - 70 U/L Final     Bilirubin Total   Date Value Ref Range Status   05/21/2019 0.3 0.2 - 1.3 mg/dL Final     Lab Results   Component Value Date     06/15/2019    BUN 16 06/15/2019    CO2 25 06/15/2019     Imaging:    CT PELVIS  06/12/2019  IMPRESSION:   1. Complex rim-enhancing fluid collection within the left scrotum,  concerning for developing abscess, less likely hematoma. There is  substantial associated scrotal edema, without evidence of scrotal air.  Consider further evaluation with new scrotal ultrasound if clinically  indicated. No definite abscess is seen on prior ultrasound.  2. Moderate left and small right  hydrocele.  3. Unchanged swirled appearance of the internal contents of the left  scrotal sac, cannot completely rule out torsion which was not present  on ultrasound 6/10/2008.

## 2019-06-16 ENCOUNTER — PATIENT OUTREACH (OUTPATIENT)
Dept: CARE COORDINATION | Facility: CLINIC | Age: 59
End: 2019-06-16

## 2019-06-17 NOTE — PROGRESS NOTES
[facility]: Post-Discharge Note  SITUATION                                                      Admission:    Admission Date: 06/12/19   Reason for Admission: Scrotal cellulitis  Discharge:   Discharge Date: 06/15/19  Discharge Diagnosis: Scrotal cellulitis  Discharge Service: Urology     BACKGROUND                                                      Dima Albright is a 58 year old male with a  PMH of hypertension who recently underwent left spermatocele repair (6/4/19) who is now admitted with worsening scrotal pain, swelling and erythema consistent with cellulitis.  The patient initially returned to clinic 2 days ago with pain and scrotal swelling.  US at that time revealed no evidence of an underlying abscess and exam revealed no evidence of an underlying necrotizing infection.  He was started on augmentin for treatment of cellulitis and discharge to home.  The patient returned today to clinic with worsening findings on exam prompting the present admission.     Patient denies fevers and chills.  He continues to void without difficulty.  The patient notes no dysuria or increased urinary urgency of frequency.  He continues to tolerate a regular diet without nausea, vomiting or diarrhea.     ASSESSMENT      Discharge Assessment  Patient reports symptoms are: Unchanged  Does the patient have all of their medications?: Yes  Does patient know what their new medications are for?: Yes  Does patient have a follow-up appointment scheduled?: Yes  Does patient have any other questions or concerns?: No    Post-op  Did the patient have surgery or a procedure: No  Fever: No  Chills: No  Eating & Drinking: eating and drinking without complaints/concerns  PO Intake: regular diet  Bowel Function: normal  Urinary Status: voiding without complaint/concerns    PLAN                                                      Outpatient Plan:  Follow up with Dr Mark in clinic on Wednesday or Thursday this week    Future  Appointments   Date Time Provider Department [place]   6/18/2019 10:10 AM Walker, Jake K, MD [facility] [place]   6/19/2019 11:30 AM Jovanni Mark MD [facility], [facility]

## 2019-06-18 LAB
BACTERIA SPEC CULT: NO GROWTH
Lab: NORMAL
SPECIMEN SOURCE: NORMAL

## 2019-06-19 ENCOUNTER — OFFICE VISIT (OUTPATIENT)
Dept: UROLOGY | Facility: CLINIC | Age: 59
End: 2019-06-19
Payer: COMMERCIAL

## 2019-06-19 VITALS — DIASTOLIC BLOOD PRESSURE: 73 MMHG | HEART RATE: 82 BPM | SYSTOLIC BLOOD PRESSURE: 122 MMHG | OXYGEN SATURATION: 97 %

## 2019-06-19 DIAGNOSIS — N49.2 CELLULITIS OF SCROTUM: Primary | ICD-10-CM

## 2019-06-19 LAB
BACTERIA SPEC CULT: NO GROWTH
SPECIMEN SOURCE: NORMAL

## 2019-06-19 PROCEDURE — 99024 POSTOP FOLLOW-UP VISIT: CPT | Performed by: UROLOGY

## 2019-06-19 RX ORDER — SULFAMETHOXAZOLE/TRIMETHOPRIM 800-160 MG
2 TABLET ORAL 2 TIMES DAILY
Qty: 56 TABLET | Refills: 0 | Status: SHIPPED | OUTPATIENT
Start: 2019-06-19 | End: 2019-07-15

## 2019-06-19 RX ORDER — ONDANSETRON 8 MG/1
8 TABLET, FILM COATED ORAL EVERY 8 HOURS PRN
Qty: 15 TABLET | Refills: 3 | Status: SHIPPED | OUTPATIENT
Start: 2019-06-19 | End: 2019-07-15

## 2019-06-19 ASSESSMENT — PAIN SCALES - GENERAL: PAINLEVEL: MODERATE PAIN (5)

## 2019-06-19 NOTE — NURSING NOTE
Dima Albright's goals for this visit include:   Chief Complaint   Patient presents with     RECHECK     Post-op       He requests these members of his care team be copied on today's visit information: Yes    PCP: No Ref-Primary, Physician    Referring Provider:  Jovanni Mark MD  [address]  [place] [address]    /73 (BP Location: Left arm, Patient Position: Sitting, Cuff Size: Adult Large)   Pulse 82   SpO2 97%     Do you need any medication refills at today's visit? No

## 2019-06-19 NOTE — PROGRESS NOTES
CC: Dima Albright is post-op from left spermatocele repair done 6/4/19.    HPI: Patient is 2 wks post-op.  He was hospitalized last week with scrotal cellulitis.  Discharged on Bactrim DS 2 tab BID and Augmentin 875mg BID.  He has a little nausea from the antibiotics, but otherwise his large scrotal edema is decreasing.  Scrotum no longer tender to palpation.  Subjectively he looks and feels much better than he did a week ago.  Sometimes notes some nausea when taking antibiotics.    Exam:     /73 (BP Location: Left arm, Patient Position: Sitting, Cuff Size: Adult Large)   Pulse 82   SpO2 97%     Alert, NAD.     Respirations normal, non-labored.    Incision healing well. No discharge, erythema or fluctuence suggestive of infection. There is still some erythema on the left scrotum and thick edematous scrotal skin, especially on his left hemiscrotum.  The right scrotum is decompressed and has only mild edema at this time, left still with moderate edema.  Scrotum is not tender to palpation.  There is still some taut swelling in the left hemiscrotum, but aspiration of the fluid as well as blood cultures have had negative cultures.    PLAN:     Advised finish the current 2 week supply of antibiotics.  I tentatively wrote to extend both of these antibiotics if there is still cellulitis in another week.  Advised return to clinic in 2-3 weeks for recheck, sooner if needed.      Alexander VARGAS  Visit within post-op global.

## 2019-06-20 LAB
BACTERIA SPEC CULT: NO GROWTH
BACTERIA SPEC CULT: NO GROWTH
Lab: NORMAL
Lab: NORMAL
SPECIMEN SOURCE: NORMAL
SPECIMEN SOURCE: NORMAL

## 2019-06-21 LAB
BACTERIA SPEC CULT: NORMAL
Lab: NORMAL
SPECIMEN SOURCE: NORMAL

## 2019-07-10 ENCOUNTER — OFFICE VISIT (OUTPATIENT)
Dept: UROLOGY | Facility: CLINIC | Age: 59
End: 2019-07-10
Payer: COMMERCIAL

## 2019-07-10 VITALS — SYSTOLIC BLOOD PRESSURE: 139 MMHG | OXYGEN SATURATION: 95 % | HEART RATE: 85 BPM | DIASTOLIC BLOOD PRESSURE: 73 MMHG

## 2019-07-10 DIAGNOSIS — Z09 POSTOP CHECK: Primary | ICD-10-CM

## 2019-07-10 PROCEDURE — 99024 POSTOP FOLLOW-UP VISIT: CPT | Performed by: UROLOGY

## 2019-07-10 ASSESSMENT — PAIN SCALES - GENERAL: PAINLEVEL: NO PAIN (0)

## 2019-07-10 NOTE — NURSING NOTE
Dima Albright's goals for this visit include:   Chief Complaint   Patient presents with     RECHECK     Post op        He requests these members of his care team be copied on today's visit information: yes    PCP: No Ref-Primary, Physician    Referring Provider:  Jovanni Mark MD  [address]  [place] [address]    /73   Pulse 85   SpO2 95%     Do you need any medication refills at today's visit? No    Bailey Nieto, CMA

## 2019-07-10 NOTE — PROGRESS NOTES
CC: Dima Albright is post-op from left spermatocele repair done 6/4/19.    HPI: Patient is 5 wks post-op.  He was hospitalized 6/12/19 for scrotal cellulitis.  He has been on Bactrim antibiotics but has now stopped these due to stomach upset. He notes the scrotum is dramatically smaller overall.  There is still some fullness in the left hemiscrotum, but overall scrotal size is much much smaller than when I last saw him.    Exam:   /73   Pulse 85   SpO2 95%     Alert, NAD.     Respirations normal, non-labored.    Incision healed well. No discharge, erythema or fluctuence suggestive of infection. No scrotal edema or erythema.  The right scrotum is decompressed but the left has josue-sized induration.  Left scrotum is minimally tender to palpation.  There is still some taut swelling in the left hemiscrotum, but aspiration of the fluid as well as blood cultures have been negative.    PLAN:     Follow-up PRN     Advised continued scrotal support.  Anticipate slow improvement in left scrotal fullness.      If left hemiscrotum is not improving with time, advised him we can consider scrotal ultrasound and aspiration if there is hydrocele fluid present.    Alexander VARGAS  Visit within post-op global.

## 2019-07-10 NOTE — LETTER
[address] [address]  [phone]          July 10, 2019    RE:  Dima Albright                                                                                                 1960                                                        [id number]  [place] [id number]            To whom it may concern:    Dima Albright is under my professional care in urology clinic.  He underwent outpatient surgery 19 that was complicated by infection and need for hospital admission for intravenous antibiotics from 19-6/15/19.     He is continuing to recover slowly.  He may return to work without any duty restrictions as of 19.  Please contact my office if there are any questions or concerns.          Sincerely,      Jovanni Mark MD

## 2019-07-15 ENCOUNTER — TELEPHONE (OUTPATIENT)
Dept: PEDIATRICS | Facility: CLINIC | Age: 59
End: 2019-07-15

## 2019-07-15 ENCOUNTER — OFFICE VISIT (OUTPATIENT)
Dept: PEDIATRICS | Facility: CLINIC | Age: 59
End: 2019-07-15
Payer: COMMERCIAL

## 2019-07-15 VITALS
SYSTOLIC BLOOD PRESSURE: 124 MMHG | OXYGEN SATURATION: 95 % | WEIGHT: 315 LBS | HEIGHT: 72 IN | HEART RATE: 78 BPM | TEMPERATURE: 98.4 F | DIASTOLIC BLOOD PRESSURE: 62 MMHG | BODY MASS INDEX: 42.66 KG/M2

## 2019-07-15 DIAGNOSIS — E66.01 MORBID OBESITY (H): Primary | ICD-10-CM

## 2019-07-15 DIAGNOSIS — I10 MILD HYPERTENSION: ICD-10-CM

## 2019-07-15 DIAGNOSIS — Z71.6 ENCOUNTER FOR TOBACCO USE CESSATION COUNSELING: ICD-10-CM

## 2019-07-15 DIAGNOSIS — J43.8 OTHER EMPHYSEMA (H): ICD-10-CM

## 2019-07-15 PROBLEM — L03.90 CELLULITIS: Status: RESOLVED | Noted: 2019-06-12 | Resolved: 2019-07-15

## 2019-07-15 PROCEDURE — 99214 OFFICE O/P EST MOD 30 MIN: CPT | Performed by: INTERNAL MEDICINE

## 2019-07-15 RX ORDER — INHALER, ASSIST DEVICES
SPACER (EA) MISCELLANEOUS
Qty: 1 EACH | Refills: 1 | Status: SHIPPED | OUTPATIENT
Start: 2019-07-15

## 2019-07-15 RX ORDER — LISINOPRIL 10 MG/1
10 TABLET ORAL DAILY
Qty: 90 TABLET | Refills: 3 | Status: SHIPPED | OUTPATIENT
Start: 2019-07-15

## 2019-07-15 RX ORDER — ALBUTEROL SULFATE 90 UG/1
2 AEROSOL, METERED RESPIRATORY (INHALATION) EVERY 6 HOURS
Qty: 3 INHALER | Refills: 3 | Status: SHIPPED | OUTPATIENT
Start: 2019-07-15

## 2019-07-15 RX ORDER — ALBUTEROL SULFATE 90 UG/1
2 AEROSOL, METERED RESPIRATORY (INHALATION) EVERY 6 HOURS
Qty: 1 G | Refills: 1 | Status: SHIPPED | OUTPATIENT
Start: 2019-07-15 | End: 2019-07-15

## 2019-07-15 ASSESSMENT — MIFFLIN-ST. JEOR: SCORE: 2394.81

## 2019-07-15 NOTE — PROGRESS NOTES
Recheck Blood Pressure    Dima Albright is a 58 year old male who presents to clinic today for the following health issues:    HPI   Hypertension Follow-up      Do you check your blood pressure regularly outside of the clinic? No     Are you following a low salt diet?  Yes, no added salt    Are your blood pressures ever more than 140 on the top number (systolic) OR more   than 90 on the bottom number (diastolic), for example 140/90?  Not checking blood pressure     Amount of exercise or physical activity: None due to SOB    Problems taking medications regularly: No    Medication side effects: none    Diet: low salt      Patient concerned with his breathing, would like a script for chantix and an inhaler.    Patient comes in for follow-up on hypertension.  He is tolerating lisinopril well and has no side effects.  Blood pressure has come down to normal range.  He is a chronic tobacco user.  Has smoked 1 pack of cigarettes a day for 40 years.  Is complaining of shortness of breath.  About 6 months ago when he was in ER in [place] he had a chest x-ray and he was told at that time that he has chest x-ray was consistent with COPD.  He is wheezing at times and cough.  Is requesting inhalers.  Is also requesting Chantix to help him quit tobacco.  His signs and [place] and comes to [place] off and on.  Is a  and he is all over the country so his health care has been very fragmented.    He had a recent complication of surgery and developed cellulitis of the scrotum.  That has not resolved.  His labs during that hospitalization look good including renal function so he is doing well with lisinopril.      Patient Active Problem List   Diagnosis     Morbid obesity (H)     Left hydrocele     MICHA on CPAP     Mild hypertension     Cellulitis of scrotum     Other emphysema (H)     Encounter for tobacco use cessation counseling     Past Surgical History:   Procedure Laterality Date     HYDROCELECTOMY SCROTAL Left 6/4/2019     Procedure: LEFT SCROTAL HYDROCELECTOMY REPAIR, AND SCROTAL DRAIN PLACEMENT;  Surgeon: Jovanni Mark MD;  Location: [facility] OR     IR FINE NEEDLE ASPIRATION W ULTRASOUND  6/14/2019       Social History     Tobacco Use     Smoking status: Current Every Day Smoker     Smokeless tobacco: Former User   Substance Use Topics     Alcohol use: Not on file     History reviewed. No pertinent family history.      Current Outpatient Medications   Medication Sig Dispense Refill     albuterol (PROAIR HFA/PROVENTIL HFA/VENTOLIN HFA) 108 (90 Base) MCG/ACT inhaler Inhale 2 puffs into the lungs every 6 hours 3 Inhaler 3     fluticasone-salmeterol (ADVAIR) 100-50 MCG/DOSE inhaler Inhale 1 puff into the lungs every 12 hours 3 Inhaler 3     lisinopril (PRINIVIL/ZESTRIL) 10 MG tablet Take 1 tablet (10 mg) by mouth daily 90 tablet 3     spacer (OPTICHAMBER BLANCA) holding chamber Use with inhaller 1 each 1     varenicline (CHANTIX NOY) 0.5 MG X 11 & 1 MG X 42 tablet Take 0.5 mg tab daily for 3 days, THEN 0.5 mg tab twice daily for 4 days, THEN 1 mg twice daily. 53 tablet 0     No Known Allergies      Reviewed and updated as needed this visit by Provider         Review of Systems   ROS COMP: Constitutional, HEENT, cardiovascular, pulmonary, GI, , musculoskeletal, neuro, skin, endocrine and psych systems are negative, except as otherwise noted.      Objective    /62 (BP Location: Right arm, Patient Position: Sitting, Cuff Size: Adult Large)   Pulse 78   Temp 98.4  F (36.9  C) (Temporal)   Ht 1.829 m (6')   Wt (!) 152.7 kg (336 lb 9.6 oz)   SpO2 95%   BMI 45.65 kg/m    Body mass index is 45.65 kg/m .  Physical Exam   GENERAL: healthy, alert and no distress  NECK: no adenopathy, no asymmetry, masses, or scars and thyroid normal to palpation  RESP: lungs clear to auscultation - no rales, rhonchi or wheezes and decreased breath sounds bilateral  CV: regular rate and rhythm, normal S1 S2, no S3 or S4, no murmur, click or rub, no  peripheral edema and peripheral pulses strong  ABDOMEN: soft, nontender, no hepatosplenomegaly, no masses and bowel sounds normal  MS: no gross musculoskeletal defects noted, no edema    Diagnostic Test Results:  Labs reviewed in Epic  Results for orders placed or performed during the hospital encounter of 06/12/19   CT Pelvis Soft Tissue w Contrast   Result Value Ref Range    Radiologist flags Concern for developing abscess and concern for (Urgent)     Narrative    EXAMINATION: CT PELVIS SOFT TISSUE W CONTRAST, 6/12/2019 7:56 PM    TECHNIQUE:  Helical CT images from the iliac crests through the  symphysis pubis were obtained with IV contrast. Contrast dose:  iopamidol (ISOVUE-370) solution 135 mL     COMPARISON: Ultrasound 6/10/2019, 3/19/2019    HISTORY: Scrotal mass or lump; Patient with worsening scrotal pain,  swelling and erythema s/p spermatocele repair - PLEASE INCLUDE ENTIRE  SCROTUM in images    FINDINGS:    Pelvis:    Substantial soft tissue swelling and edema within the scrotum,  particularly involving the left aspect of the scrotum. There is a  rim-enhancing complex fluid collection within the left aspect of the  scrotum measuring approximately 4.2 x 7.5 x 5.9 cm. No air is seen  within the scrotum. The testicles are unremarkable in appearance.  Small right and moderate left hydrocele. Unchanged swirled appearance  of the blood vessels and fat extending into the scrotum on the left.    The visualized bowel in the lower abdomen is unremarkable. The  appendix is identified in the right lower quadrant and is unremarkable  in appearance. No free air. No free fluid. No pneumatosis in the  partially visualized loops of bowel. Symmetric seminal vesicles.  Pelvic phleboliths. The urinary bladder is distended and otherwise  unremarkable in appearance. Dense central prostatic calcifications.  The prostate measures up to 5.3 cm diameter. No fluid within the  pelvis or visualized abdomen. No free air.    Bones and  soft tissues: No acute osseous abnormality. Mild  degenerative changes of the lumbosacral junction, with disc space  narrowing and intradiscal gas. Degenerative changes of bilateral SI  joints.      Impression    IMPRESSION:   1. Complex rim-enhancing fluid collection within the left scrotum,  concerning for developing abscess, less likely hematoma. There is  substantial associated scrotal edema, without evidence of scrotal air.  Consider further evaluation with new scrotal ultrasound if clinically  indicated. No definite abscess is seen on prior ultrasound.  2. Moderate left and small right hydrocele.  3. Unchanged swirled appearance of the internal contents of the left  scrotal sac, cannot completely rule out torsion which was not present  on ultrasound 6/10/2008.    [Urgent Result: Concern for developing abscess and concern for  testicular torsion]    Finding was identified on 6/12/2019 8:20 PM.     Dr. Orlando was contacted by Dr. La at 6/12/2019 8:46 PM and  verbalized understanding of the urgent finding.     I have personally reviewed the examination and initial interpretation  and I agree with the findings.    JOE LA MD   IR Fine Needle Aspiration w Imaging    Narrative    PROCEDURES 6/14/2019 10:46 AM:  1. Ultrasound guided left scrotal aspiration    Clinical History: Aspiration fluid collection within the left scrotum.      Comparisons: CT 6/12/2019    Staff: Padmini Gaspar MD    Fellow/Resident: Antoni Marlow MD    I, PADMINI GASPAR MD, attest that I was present for all critical portions  of the procedure and was immediately available to provide guidance and  assistance during the remainder of the procedure.    Monitoring: Patient was placed on continuous monitoring with  intravenous conscious sedation administered by the IR nursing staff  and supervised by the IR attending. Patient remained stable throughout  the procedure.     Medications:  1. Versed IV: 2.0 mg  2. Fentanyl IV: 100 mcg  3. 7 cc  1% lidocaine    Sedation time: 25 minutes    Attending face-to-face sedation time: Less than 5 minutes.    PROCEDURE: The patient understood the limitations, alternatives, and  risks of the procedure and requested the procedure be performed. Both  written and oral consent were obtained.    Limited preprocedure scan demonstrated complex left superior scrotal  fluid collection.    Left scrotum was prepped and draped in the usual sterile fashion. 1%  lidocaine was used for local anesthesia. Under ultrasound guidance, a  20-gauge coaxial needle was advanced into the into the superior left  scrotal fluid collection. Ultrasound image documenting placement was  saved in the patient's record.    The inner stylet of the needle was removed.  18 cc turbid  serosanguineous fluid aspirated. Needle removed. Sterile dressing  applied. The patient tolerated the procedure well. No immediate  complication.    Estimate blood loss: Minimal    Specimens: Approximately 18 cc turbid serosanguineous fluid.       Impression    IMPRESSION:  Successful aspiration of the complex left scrotal fluid  collection, 18 cc turbid brown serosanguineous fluid was sent for  laboratory analysis/culture.    I have personally reviewed the examination and initial interpretation  and I agree with the findings.    PADMINI GASPAR MD   Basic metabolic panel   Result Value Ref Range    Sodium 138 133 - 144 mmol/L    Potassium 4.2 3.4 - 5.3 mmol/L    Chloride 106 94 - 109 mmol/L    Carbon Dioxide 27 20 - 32 mmol/L    Anion Gap 4 3 - 14 mmol/L    Glucose 133 (H) 70 - 99 mg/dL    Urea Nitrogen 17 7 - 30 mg/dL    Creatinine 1.00 0.66 - 1.25 mg/dL    GFR Estimate 84 >60 mL/min/[1.73_m2]    GFR Estimate If Black >90 >60 mL/min/[1.73_m2]    Calcium 9.1 8.5 - 10.1 mg/dL   CBC with platelets   Result Value Ref Range    WBC 13.9 (H) 4.0 - 11.0 10e9/L    RBC Count 4.80 4.4 - 5.9 10e12/L    Hemoglobin 14.4 13.3 - 17.7 g/dL    Hematocrit 45.8 40.0 - 53.0 %    MCV 95 78 - 100 fl     MCH 30.0 26.5 - 33.0 pg    MCHC 31.4 (L) 31.5 - 36.5 g/dL    RDW 13.2 10.0 - 15.0 %    Platelet Count 377 150 - 450 10e9/L   Basic metabolic panel   Result Value Ref Range    Sodium 139 133 - 144 mmol/L    Potassium 4.3 3.4 - 5.3 mmol/L    Chloride 109 94 - 109 mmol/L    Carbon Dioxide 26 20 - 32 mmol/L    Anion Gap 5 3 - 14 mmol/L    Glucose 104 (H) 70 - 99 mg/dL    Urea Nitrogen 18 7 - 30 mg/dL    Creatinine 1.00 0.66 - 1.25 mg/dL    GFR Estimate 84 >60 mL/min/[1.73_m2]    GFR Estimate If Black >90 >60 mL/min/[1.73_m2]    Calcium 8.8 8.5 - 10.1 mg/dL   CBC with platelets   Result Value Ref Range    WBC 11.3 (H) 4.0 - 11.0 10e9/L    RBC Count 4.62 4.4 - 5.9 10e12/L    Hemoglobin 13.8 13.3 - 17.7 g/dL    Hematocrit 44.3 40.0 - 53.0 %    MCV 96 78 - 100 fl    MCH 29.9 26.5 - 33.0 pg    MCHC 31.2 (L) 31.5 - 36.5 g/dL    RDW 13.4 10.0 - 15.0 %    Platelet Count 349 150 - 450 10e9/L   Hemoglobin A1c   Result Value Ref Range    Hemoglobin A1C 6.4 (H) 0 - 5.6 %   CRP inflammation   Result Value Ref Range    CRP Inflammation 48.0 (H) 0.0 - 8.0 mg/L   INR   Result Value Ref Range    INR 0.96 0.86 - 1.14   CBC with platelets differential   Result Value Ref Range    WBC 11.8 (H) 4.0 - 11.0 10e9/L    RBC Count 4.43 4.4 - 5.9 10e12/L    Hemoglobin 13.3 13.3 - 17.7 g/dL    Hematocrit 43.7 40.0 - 53.0 %    MCV 99 78 - 100 fl    MCH 30.0 26.5 - 33.0 pg    MCHC 30.4 (L) 31.5 - 36.5 g/dL    RDW 13.3 10.0 - 15.0 %    Platelet Count 364 150 - 450 10e9/L    Diff Method Automated Method     % Neutrophils 61.8 %    % Lymphocytes 24.6 %    % Monocytes 7.8 %    % Eosinophils 4.9 %    % Basophils 0.3 %    % Immature Granulocytes 0.6 %    Nucleated RBCs 0 0 /100    Absolute Neutrophil 7.3 1.6 - 8.3 10e9/L    Absolute Lymphocytes 2.9 0.8 - 5.3 10e9/L    Absolute Monocytes 0.9 0.0 - 1.3 10e9/L    Absolute Eosinophils 0.6 0.0 - 0.7 10e9/L    Absolute Basophils 0.0 0.0 - 0.2 10e9/L    Abs Immature Granulocytes 0.1 0 - 0.4 10e9/L    Absolute  Nucleated RBC 0.0    Creatinine   Result Value Ref Range    Creatinine 1.08 0.66 - 1.25 mg/dL    GFR Estimate 76 >60 mL/min/[1.73_m2]    GFR Estimate If Black 88 >60 mL/min/[1.73_m2]   Basic metabolic panel   Result Value Ref Range    Sodium 139 133 - 144 mmol/L    Potassium 4.8 3.4 - 5.3 mmol/L    Chloride 108 94 - 109 mmol/L    Carbon Dioxide 27 20 - 32 mmol/L    Anion Gap 5 3 - 14 mmol/L    Glucose 99 70 - 99 mg/dL    Urea Nitrogen 16 7 - 30 mg/dL    Creatinine 1.08 0.66 - 1.25 mg/dL    GFR Estimate 76 >60 mL/min/[1.73_m2]    GFR Estimate If Black 88 >60 mL/min/[1.73_m2]    Calcium 8.8 8.5 - 10.1 mg/dL   CBC with platelets   Result Value Ref Range    WBC 11.2 (H) 4.0 - 11.0 10e9/L    RBC Count 4.54 4.4 - 5.9 10e12/L    Hemoglobin 13.6 13.3 - 17.7 g/dL    Hematocrit 45.2 40.0 - 53.0 %     78 - 100 fl    MCH 30.0 26.5 - 33.0 pg    MCHC 30.1 (L) 31.5 - 36.5 g/dL    RDW 13.3 10.0 - 15.0 %    Platelet Count 330 150 - 450 10e9/L   Vancomycin level   Result Value Ref Range    Vancomycin Level 11.6 mg/L   Basic metabolic panel   Result Value Ref Range    Sodium 138 133 - 144 mmol/L    Potassium 4.1 3.4 - 5.3 mmol/L    Chloride 109 94 - 109 mmol/L    Carbon Dioxide 25 20 - 32 mmol/L    Anion Gap 4 3 - 14 mmol/L    Glucose 94 70 - 99 mg/dL    Urea Nitrogen 16 7 - 30 mg/dL    Creatinine 1.05 0.66 - 1.25 mg/dL    GFR Estimate 79 >60 mL/min/[1.73_m2]    GFR Estimate If Black >90 >60 mL/min/[1.73_m2]    Calcium 8.4 (L) 8.5 - 10.1 mg/dL   CBC with platelets   Result Value Ref Range    WBC 10.8 4.0 - 11.0 10e9/L    RBC Count 4.23 (L) 4.4 - 5.9 10e12/L    Hemoglobin 12.6 (L) 13.3 - 17.7 g/dL    Hematocrit 42.0 40.0 - 53.0 %    MCV 99 78 - 100 fl    MCH 29.8 26.5 - 33.0 pg    MCHC 30.0 (L) 31.5 - 36.5 g/dL    RDW 13.4 10.0 - 15.0 %    Platelet Count 355 150 - 450 10e9/L   Infectious Disease General Adult IP Consult: Patient to be seen: Routine within 24 hrs; Call back #: [phone]; Cellulitis s/p spermatocelectomy.   Assist w ABX mgmt.; Consultant may enter orders: Yes; Requesting provider? Attending physician; N...    Evie Marcelino MD     6/13/2019  1:47 PM    [facility] ID SERVICE CONSULTATION     Patient:  Dima Albright   Date of birth 1960, Medical record number [id number]  Date of Visit:  06/13/2019  Date of Admission: 6/12/2019  Consult Requester:Jovanni Mark MD            Assessment and Recommendations:   Problem list:  1. Soft tissue infection: scrotal cellulitis and abscess  2. S/p left scrotal hydrocelectomy 06/04    RECOMMENDATION:  1. Continue with current antibiotic regimen ( IV vancomycin and   zosyn), with plan to deescalate depending on clinical improvement   and/or when cultures results are available.  2. Agree with I & D or aspiration of scrotal abscess, please send   specimens for gram stain and cultures  3. Consider trending CRP   4. Pain control per primary team  5. ID will continue to follow.    DISCUSSION  Dima Albright, 58 year old male, with significant PMH for   hydrocele >10years, hypertension, obesity, MICHA on home CPAP who   was admitted here last night 06/12 with worsening swelling, pain   and redness of his scrotum; 9 days following left scrotal   hydrocelectomy. CT pelvis with complex rim-enhancing fluid   collection within the left scrotum, concerning for developing   abscess. Given recent instrumentation and site of infection,   patient likely has polymicrobial soft tissue infection. He is   currently appropriately covered with broad spectrum   antibiotics-vancomycin for possible staph, and zoysn coverage of   coliforms/anearobes/enteroccoci. Ideally, the abscess should be   drained if feasible, and specimens sent for cultures, as that   would be helpful in narrowing down his antibiotics.    Thanks for the opportunity to take part in Mr. Albright's care-ID   will continue to follow along with you.    Patient seen and discussed with Dr. Rodriguez.      Evie Benites MD, MPH  Internal Medicine PGY 1  Pager: [phone]  _____________________________________________________________    Consult Question:.Antibiotics management for scrotal cellulitis         History of Present Illness:   Dima Albright, 58 year old male, with significant PMH for   hydrocele >10years, Hypertension, Obesity, MICHA on home CPAP who   was admitted here last night 06/12 with worsening pain, redness   and pain of his scrotum.  Per chart review and patient's report- he underwent left   hydrocelectomy on 06/04. Post surgery, he noted progressively   worsening pain, swelling and redness prompting clinic visit on   06/08. At that visit, a scrotal ultrasound was done with no   abscess, but the exam was concerning for cellulitis- he was sent   home on Augmentin. Despite antibiotics, he reported progressive   symptoms and he returned to clinic on 06/12 with worsening exam   findings. Referred for admission and IV antibiotics.  On   admission, a CT was done- now showing a complex rim-enhancing   fluid collection within the left scrotum, concerning for   developing abscess. ID consulted for antibiotic guidance.    At bedside, patient denies any fevers, chills, nausea, vomiting,   diarrhea or abdominal pain. No dysuria, frequency or hematuria.   Appetite is good and feels like pain is controlled on current   pain regimen. No known allergies to medicine.  Works as . Lives with sister in recent days in   [place]. Home in [place].    Antibiotics:  -IV vancomycin 06/12-  -IV zoysn 06/12-  -Augmentin 06/08-06/12    Recent culture results include:  Culture Micro   Date Value Ref Range Status   06/12/2019 No growth after 10 hours  Preliminary   06/12/2019 No growth after 10 hours  Preliminary     CT PELVIS  06/12/2019  IMPRESSION:   1. Complex rim-enhancing fluid collection within the left   scrotum,  concerning for developing abscess, less likely hematoma. There is  substantial associated  scrotal edema, without evidence of scrotal   air.  Consider further evaluation with new scrotal ultrasound if   clinically  indicated. No definite abscess is seen on prior ultrasound.  2. Moderate left and small right hydrocele.  3. Unchanged swirled appearance of the internal contents of the   left  scrotal sac, cannot completely rule out torsion which was not   present  on ultrasound 6/10/2008.         Review of Systems:   CONSTITUTIONAL:  No fevers or chills  EYES: negative for icterus  ENT:  negative for hearing loss, tinnitus and sore throat  RESPIRATORY:  negative for cough with sputum and dyspnea  CARDIOVASCULAR:  negative for chest pain, dyspnea  GASTROINTESTINAL:  negative for nausea, vomiting, diarrhea and   constipation  GENITOURINARY:  negative for dysuria  HEME:  No easy bruising  INTEGUMENT:  negative for rash and pruritus  NEURO:  Negative for headache         Past Medical History:     Past Medical History:   Diagnosis Date     Left hydrocele 5/20/2019     Mild hypertension      Morbid obesity (H) 3/18/2019     MICHA on CPAP 5/20/2019            Past Surgical History:     Past Surgical History:   Procedure Laterality Date     HYDROCELECTOMY SCROTAL Left 6/4/2019    Procedure: LEFT SCROTAL HYDROCELECTOMY REPAIR, AND SCROTAL DRAIN   PLACEMENT;  Surgeon: Jovanni Mark MD;  Location: [facility] OR            Family History:   No family history on file.         Social History:     Social History     Tobacco Use     Smoking status: Current Every Day Smoker     Smokeless tobacco: Former User   Substance Use Topics     Alcohol use: Not on file     History   Sexual Activity     Sexual activity: Not on file            Current Medications (antimicrobials listed in bold):       acetaminophen  650 mg Oral Q4H     lisinopril  10 mg Oral Daily     nicotine  1 patch Transdermal Daily     nicotine   Transdermal Q8H     nicotine   Transdermal Daily     piperacillin-tazobactam  3.375 g Intravenous Q6H     senna-docusate  1  tablet Oral BID    Or     senna-docusate  2 tablet Oral BID     vancomycin (VANCOCIN) IV  2,000 mg Intravenous Q12H            Allergies:   No Known Allergies         Physical Exam:   Vitals were reviewed  Ranges for his vital signs:  Temp:  [96  F (35.6  C)-97.9  F   (36.6  C)] 96.5  F (35.8  C)  Pulse:  [73-88] 77  Resp:  [16-18] 16  BP: (112-132)/(48-70) 131/55  SpO2:  [92 %-94 %] 92 %    Physical Examination:  GENERAL:  Obese,pleasant, in bed in no acute distress.   EYES:  Eyes have anicteric sclerae without conjunctival injection     ENT:  Oropharynx is moist without exudates or ulcers. Tongue is   midline  NECK:  Supple. No  Cervical lymphadenopathy  LUNGS:  Clear to auscultation bilateral.   CARDIOVASCULAR:  Regular rate and rhythm with no murmurs, gallops   or rubs.  ABDOMEN:  Normal bowel sounds, soft, nontender. No appreciable   hepatosplenomegaly  SKIN: scaly nodular rash on forearms.  Line(s) are in place   without any surrounding erythema or exudate. No stigmata of   endocarditis.  NEUROLOGIC:  Grossly nonfocal. Active x4 extremities  Scrotum: Penis buried in swollen erthymematous /edematous   scrotum. Left scrotum redder and feels firmer than right. No   appreciable inguinal lymphadenopathy.         Laboratory Data:     Hematology Studies    Recent Labs   Lab Test 06/13/19  0646 06/12/19  1611 05/21/19  0955   WBC 11.3* 13.9* 13.0*   HGB 13.8 14.4 16.3   MCV 96 95 93    377 321       Metabolic Studies     Recent Labs   Lab Test 06/13/19  0646 06/12/19  1611 05/21/19  0955    138 138   POTASSIUM 4.3 4.2 4.3   CHLORIDE 109 106 107   CO2 26 27 26   BUN 18 17 16   CR 1.00 1.00 0.91   GFRESTIMATED 84 84 >90       Hepatic Studies    Recent Labs   Lab Test 05/21/19  0955   BILITOTAL 0.3   ALKPHOS 111   ALBUMIN 3.6   AST 20   ALT 29       Microbiology:  Culture Micro   Date Value Ref Range Status   06/12/2019 No growth after 10 hours  Preliminary   06/12/2019 No growth after 10 hours  Preliminary         Interventional Radiology Adult/Peds IP Consult: Patient to be seen: URGENT within 4 hours; Call back #: [phone]; scrotal fluid collection possible abscess not amenable to open exploration - please aspirate possible ascess; Requesting provide...    Caro Espinoza, TOM     6/13/2019 11:57 AM      Patient is on IR schedule 06/14/2019 for a aspiration of fluid   collection within the left scrotum,.   Labs ordered for procedure.  Orders for NPO, scrubs  have been entered.   Consent will be done prior to procedure.  Please [place] the [place] [phone] for estimated time of   procedure.   left spermatocele repair now with complex rim-enhancing fluid   collection within the left scrotum, .     Caro Mims IR RPA  [phone] Call pager  [phone] pager           Blood culture   Result Value Ref Range    Specimen Description Blood Left Hand     Special Requests Received in aerobic bottle only     Culture Micro (A)      Cultured on the 1st day of incubation:  Staphylococcus hominis      Culture Micro       Critical Value/Significant Value, preliminary result only, called to and read back by  SARA BOONE RN @1527 6/13/19. SCG      Culture Micro       (Note)  POSITIVE for Staphylococci other than S.aureus, S.epidermidis and  S.lugdunensis, by Verigene multiplex nucleic acid test.  Coagulase-negative staphylococci are the most common venipuncture or  collection associated skin CONTAMINANTS grown in blood cultures.  Final identification and antimicrobial susceptibility testing will be  verified by standard methods.    Specimen tested with Verigene multiplex, gram-positive blood culture  nucleic acid test for the following targets: Staph aureus, Staph  epidermidis, Staph lugdunensis, other Staph species, Enterococcus  faecalis, Enterococcus faecium, Streptococcus species, S. agalactiae,  S. anginosus grp., S. pneumoniae, S. pyogenes, Listeria sp., mecA  (methicillin  resistance) and Armani/B (vancomycin resistance).    Critical Value/Significant Value called to and read back by  Noemi Perry RN @ 5231. 6/13/19. AMV           Susceptibility    Staphylococcus hominis - SUMANTH     CIPROFLOXACIN <=0.5 Sensitive ug/mL     CLINDAMYCIN <=0.25 Sensitive ug/mL     ERYTHROMYCIN <=0.25 Sensitive ug/mL     GENTAMICIN <=0.5 Sensitive ug/mL     LEVOFLOXACIN <=0.12 Sensitive ug/mL     OXACILLIN <=0.25 Sensitive ug/mL     TETRACYCLINE >=16 Resistant ug/mL     VANCOMYCIN <=0.5 Sensitive ug/mL   Blood culture   Result Value Ref Range    Specimen Description Blood Right Hand     Special Requests Received in aerobic bottle only     Culture Micro No growth    Abscess Culture Aerobic Bacterial   Result Value Ref Range    Specimen Description Scrotum Left Abscess Fluid     Culture Micro No growth    Anaerobic bacterial culture   Result Value Ref Range    Specimen Description Scrotum Left Abscess Fluid     Special Requests Received in anaerobic tubes.     Culture Micro No anaerobes isolated    Gram stain   Result Value Ref Range    Specimen Description Scrotum Left Abscess Fluid     Gram Stain No organisms seen     Gram Stain Rare  WBC'S seen  Rare  PMNs seen      Blood culture   Result Value Ref Range    Specimen Description Blood Left Hand     Special Requests Received in aerobic bottle only     Culture Micro No growth    Blood culture   Result Value Ref Range    Specimen Description Blood Right Hand     Special Requests Received in aerobic bottle only     Culture Micro No growth            Assessment & Plan     1.  Hypertension under good control with lisinopril 10 mg a day continue the same.  2.  COPD.  Patient started on albuterol inhaler to be used as a rescue inhaler.  Also Advair 100/50 puff twice a day.  A spacer prescribed.  Patient given instruction on how to use inhalers.  3.  Chronic tobacco use.  Chantix prescription given starter pack.  He will call back for refill if it works.  4.  Morbid  obesity.  5.  Obstructive sleep apnea on CPAP.    Patient not clear as to when he will be returning back to [facility] but he thinks within the next couple of months.  He will make an appointment to see me when he is here.      Tobacco Cessation:   reports that he has been smoking.  He has quit using smokeless tobacco.          BMI:   Estimated body mass index is 45.65 kg/m  as calculated from the following:    Height as of this encounter: 1.829 m (6').    Weight as of this encounter: 152.7 kg (336 lb 9.6 oz).               No follow-ups on file.    Jake Walker MD  [facility]

## 2019-07-15 NOTE — TELEPHONE ENCOUNTER
[facility] Call Center    Phone Message    May a detailed message be left on voicemail: yes    Reason for Call: Patient called and requested a message to provider requesting an order for a cpap machine. Please call patient back to discuss.     Action Taken: Message routed to:  Primary Care p [phone]

## 2019-07-17 NOTE — TELEPHONE ENCOUNTER
Please inform patient that he will have to get a prescription for the CPAP from the clinic that initially prescribed it to him.  I have no information related to it in his record.  I will not know what to prescribe.

## 2020-05-10 DIAGNOSIS — J43.8 OTHER EMPHYSEMA (H): ICD-10-CM

## 2020-05-12 ENCOUNTER — TELEPHONE (OUTPATIENT)
Dept: PEDIATRICS | Facility: CLINIC | Age: 60
End: 2020-05-12

## 2020-05-12 DIAGNOSIS — J43.8 OTHER EMPHYSEMA (H): ICD-10-CM

## 2020-05-12 NOTE — TELEPHONE ENCOUNTER
[facility] Call Center    Phone Message    May a detailed message be left on voicemail: yes     Reason for Call: Medication Refill Request    Has the patient contacted the pharmacy for the refill? Yes   Name of medication being requested: fluticasone-salmeterol (ADVAIR) 100-50 MCG/DOSE inhaler [[id number]] (Order [id number])     Provider who prescribed the medication: Aaron  Pharmacy:    [id number] [place] [id number] [facility] [address]      Date medication is needed: unknown         Action Taken: Message routed to:  Primary Care p [phone]

## 2020-05-12 NOTE — TELEPHONE ENCOUNTER
Please inform patient I have refilled his inhaler. Please ask he establish care with PCP in [place] since he lives there. He is due for clinic visit.

## 2020-05-12 NOTE — TELEPHONE ENCOUNTER
Routing refill request to Dr. Walker to please review.  Last office visit was 7/15/2019, the notes from that appt:       His signs and [place] and comes to [place] off and on.  Is a  and he is all over the country so his health care has been very fragmented.       Patient lives in [place].  Please advise.    Guillermina Bragg RN, [facility]

## 2020-05-13 NOTE — TELEPHONE ENCOUNTER
WIXELA INHUB DISKUS 100/50MCG 60S   fluticasone-salmeterol (ADVAIR) 100-50 MCG/DOSE inhaler  3 Inhaler  0  5/12/2020   No    Sig - Route: Inhale 1 puff into the lungs every 12 hours - Inhalation    Sent to pharmacy as: Fluticasone-Salmeterol 100-50 MCG/DOSE Inhalation Aerosol Powder Breath Activated (ADVAIR)    Class: E-Prescribe    Order: [id number]    E-Prescribing Status: Receipt confirmed by pharmacy (5/12/2020  2:37 PM CDT)      Dori Gar RN  Central Triage Red Flags/Med Refills

## (undated) DEVICE — SYR EAR BULB 3OZ 0035830

## (undated) DEVICE — SU VICRYL 4-0 TIE 3X18" J643H

## (undated) DEVICE — ESU GROUND PAD UNIVERSAL W/O CORD

## (undated) DEVICE — SOL NACL 0.9% IRRIG 1000ML BOTTLE 2F7124

## (undated) DEVICE — GLOVE PROTEXIS W/NEU-THERA 7.5  2D73TE75

## (undated) DEVICE — SUCTION MANIFOLD DORNOCH ULTRA CART UL-CL500

## (undated) DEVICE — DRAPE LAP TRANSVERSE 29421

## (undated) DEVICE — RX BACITRACIN OINTMENT 0.9G 1/32OZ 01680 11109

## (undated) DEVICE — SYR 10ML FINGER CONTROL W/O NDL 309695

## (undated) DEVICE — SU VICRYL 4-0 PS-2 18" UND J496H

## (undated) DEVICE — SU VICRYL 3-0 SH 27" UND J416H

## (undated) DEVICE — GOWN XLG DISP 9545

## (undated) DEVICE — PREP CHLORAPREP 26ML TINTED ORANGE  260815

## (undated) DEVICE — SU ETHILON 3-0 PS-1 18" 1663H

## (undated) DEVICE — SU VICRYL 3-0 PS-2 18" UND J497G

## (undated) DEVICE — SUCTION TIP YANKAUER W/O VENT K86

## (undated) DEVICE — DECANTER TRANSFER DEVICE 2008S

## (undated) DEVICE — SPONGE KITTNER 31001010

## (undated) DEVICE — STRAP KNEE/BODY 31143004

## (undated) DEVICE — LINEN TOWEL PACK X5 5464

## (undated) DEVICE — BLADE CLIPPER SGL USE 9680

## (undated) DEVICE — SU MONOCRYL 4-0 PS-2 18" UND Y496G

## (undated) DEVICE — DRSG KERLIX 4 1/2"X4YDS ROLL 6730

## (undated) DEVICE — SU CHROMIC 3-0 SH 27" G122H

## (undated) DEVICE — DRAIN JACKSON PRATT 10FR ROUND SU130-1321

## (undated) DEVICE — Device

## (undated) DEVICE — LINEN ORTHO PACK 5446

## (undated) DEVICE — TUBING SUCTION MEDI-VAC SOFT 3/16"X20' N520A

## (undated) DEVICE — DRAIN JACKSON PRATT RESERVOIR 100ML SU130-1305

## (undated) RX ORDER — CITRIC ACID/SODIUM CITRATE 334-500MG
SOLUTION, ORAL ORAL
Status: DISPENSED
Start: 2019-06-04

## (undated) RX ORDER — HYDROMORPHONE HYDROCHLORIDE 1 MG/ML
INJECTION, SOLUTION INTRAMUSCULAR; INTRAVENOUS; SUBCUTANEOUS
Status: DISPENSED
Start: 2019-06-04

## (undated) RX ORDER — PHENYLEPHRINE HCL IN 0.9% NACL 1 MG/10 ML
SYRINGE (ML) INTRAVENOUS
Status: DISPENSED
Start: 2019-06-04

## (undated) RX ORDER — ONDANSETRON 2 MG/ML
INJECTION INTRAMUSCULAR; INTRAVENOUS
Status: DISPENSED
Start: 2019-06-04

## (undated) RX ORDER — CEFAZOLIN SODIUM 1 G/3ML
INJECTION, POWDER, FOR SOLUTION INTRAMUSCULAR; INTRAVENOUS
Status: DISPENSED
Start: 2019-06-04

## (undated) RX ORDER — CEFAZOLIN SODIUM IN 0.9 % NACL 3 G/100 ML
INTRAVENOUS SOLUTION, PIGGYBACK (ML) INTRAVENOUS
Status: DISPENSED
Start: 2019-06-04

## (undated) RX ORDER — PROPOFOL 10 MG/ML
INJECTION, EMULSION INTRAVENOUS
Status: DISPENSED
Start: 2019-06-04

## (undated) RX ORDER — LIDOCAINE HYDROCHLORIDE 20 MG/ML
INJECTION, SOLUTION EPIDURAL; INFILTRATION; INTRACAUDAL; PERINEURAL
Status: DISPENSED
Start: 2019-06-04

## (undated) RX ORDER — FENTANYL CITRATE 50 UG/ML
INJECTION, SOLUTION INTRAMUSCULAR; INTRAVENOUS
Status: DISPENSED
Start: 2019-06-14

## (undated) RX ORDER — LIDOCAINE HYDROCHLORIDE 10 MG/ML
INJECTION, SOLUTION EPIDURAL; INFILTRATION; INTRACAUDAL; PERINEURAL
Status: DISPENSED
Start: 2019-06-14

## (undated) RX ORDER — FENTANYL CITRATE 50 UG/ML
INJECTION, SOLUTION INTRAMUSCULAR; INTRAVENOUS
Status: DISPENSED
Start: 2019-06-04